# Patient Record
Sex: MALE | Race: WHITE | URBAN - METROPOLITAN AREA
[De-identification: names, ages, dates, MRNs, and addresses within clinical notes are randomized per-mention and may not be internally consistent; named-entity substitution may affect disease eponyms.]

---

## 2023-02-09 PROBLEM — E78.5 HLD (HYPERLIPIDEMIA): Status: ACTIVE | Noted: 2023-02-09

## 2023-02-09 PROBLEM — Z95.1 HISTORY OF CORONARY ARTERY BYPASS GRAFT: Status: ACTIVE | Noted: 2023-02-09

## 2023-02-09 PROBLEM — I25.10 CAD (CORONARY ARTERY DISEASE): Status: ACTIVE | Noted: 2023-02-09

## 2023-02-09 RX ORDER — LANOLIN ALCOHOL/MO/W.PET/CERES
CREAM (GRAM) TOPICAL DAILY
COMMUNITY
Start: 2022-07-14

## 2023-02-09 RX ORDER — ASPIRIN 81 MG/1
1 TABLET ORAL DAILY
COMMUNITY
Start: 2022-07-14 | End: 2023-04-12 | Stop reason: ALTCHOICE

## 2023-02-09 RX ORDER — LORATADINE 10 MG/1
1 TABLET ORAL DAILY PRN
COMMUNITY
Start: 2022-07-14 | End: 2024-06-04 | Stop reason: ALTCHOICE

## 2023-02-09 RX ORDER — MELATONIN 10 MG/ML
DROPS ORAL DAILY
COMMUNITY
Start: 2022-07-14

## 2023-02-09 RX ORDER — EVOLOCUMAB 140 MG/ML
140 INJECTION, SOLUTION SUBCUTANEOUS
COMMUNITY
Start: 2022-07-14

## 2023-02-09 RX ORDER — GLUCOSAMINE/CHONDR SU A SOD 750-600 MG
TABLET ORAL
COMMUNITY
Start: 2022-07-14

## 2023-02-23 PROBLEM — I25.10 ATHEROSCLEROTIC HEART DISEASE OF NATIVE CORONARY ARTERY WITHOUT ANGINA PECTORIS: Status: ACTIVE | Noted: 2023-02-23

## 2023-03-27 DIAGNOSIS — M79.604 PAIN OF RIGHT LOWER EXTREMITY: Primary | ICD-10-CM

## 2023-03-29 DIAGNOSIS — R60.9 EDEMA, UNSPECIFIED TYPE: Primary | ICD-10-CM

## 2023-04-12 ENCOUNTER — OFFICE VISIT (OUTPATIENT)
Dept: PRIMARY CARE | Facility: CLINIC | Age: 78
End: 2023-04-12
Payer: MEDICARE

## 2023-04-12 ENCOUNTER — LAB (OUTPATIENT)
Dept: LAB | Facility: LAB | Age: 78
End: 2023-04-12
Payer: MEDICARE

## 2023-04-12 VITALS — WEIGHT: 137 LBS | DIASTOLIC BLOOD PRESSURE: 80 MMHG | BODY MASS INDEX: 20.23 KG/M2 | SYSTOLIC BLOOD PRESSURE: 152 MMHG

## 2023-04-12 DIAGNOSIS — E78.41 ELEVATED LIPOPROTEIN(A): ICD-10-CM

## 2023-04-12 DIAGNOSIS — I25.10 CORONARY ARTERY DISEASE INVOLVING NATIVE CORONARY ARTERY OF NATIVE HEART WITHOUT ANGINA PECTORIS: ICD-10-CM

## 2023-04-12 DIAGNOSIS — I82.4Z1 ACUTE DEEP VEIN THROMBOSIS (DVT) OF DISTAL VEIN OF RIGHT LOWER EXTREMITY (MULTI): ICD-10-CM

## 2023-04-12 DIAGNOSIS — I82.461 ACUTE DEEP VEIN THROMBOSIS (DVT) OF CALF MUSCLE VEIN OF RIGHT LOWER EXTREMITY (MULTI): Primary | ICD-10-CM

## 2023-04-12 DIAGNOSIS — Z95.1 HISTORY OF CORONARY ARTERY BYPASS GRAFT: ICD-10-CM

## 2023-04-12 DIAGNOSIS — I82.461 ACUTE DEEP VEIN THROMBOSIS (DVT) OF CALF MUSCLE VEIN OF RIGHT LOWER EXTREMITY (MULTI): ICD-10-CM

## 2023-04-12 PROBLEM — I82.431 ACUTE DEEP VEIN THROMBOSIS (DVT) OF POPLITEAL VEIN OF RIGHT LOWER EXTREMITY (MULTI): Status: ACTIVE | Noted: 2023-04-12

## 2023-04-12 LAB
ACTIVATED PARTIAL THROMBOPLASTIN TIME IN PPP BY COAGULATION ASSAY: 32 SEC (ref 26–39)
INR IN PPP BY COAGULATION ASSAY: 1.2 (ref 0.9–1.1)
PROTHROMBIN TIME (PT) IN PPP BY COAGULATION ASSAY: 14.4 SEC (ref 9.8–13.4)

## 2023-04-12 PROCEDURE — 36415 COLL VENOUS BLD VENIPUNCTURE: CPT

## 2023-04-12 PROCEDURE — 82542 COL CHROMOTOGRAPHY QUAL/QUAN: CPT

## 2023-04-12 PROCEDURE — 1159F MED LIST DOCD IN RCRD: CPT | Performed by: FAMILY MEDICINE

## 2023-04-12 PROCEDURE — 1036F TOBACCO NON-USER: CPT | Performed by: FAMILY MEDICINE

## 2023-04-12 PROCEDURE — 83918 ORGANIC ACIDS TOTAL QUANT: CPT

## 2023-04-12 PROCEDURE — 85306 CLOT INHIBIT PROT S FREE: CPT

## 2023-04-12 PROCEDURE — 85730 THROMBOPLASTIN TIME PARTIAL: CPT

## 2023-04-12 PROCEDURE — 1160F RVW MEDS BY RX/DR IN RCRD: CPT | Performed by: FAMILY MEDICINE

## 2023-04-12 PROCEDURE — 85303 CLOT INHIBIT PROT C ACTIVITY: CPT

## 2023-04-12 PROCEDURE — 99213 OFFICE O/P EST LOW 20 MIN: CPT | Performed by: FAMILY MEDICINE

## 2023-04-12 PROCEDURE — 83090 ASSAY OF HOMOCYSTEINE: CPT

## 2023-04-12 PROCEDURE — 85610 PROTHROMBIN TIME: CPT

## 2023-04-12 PROCEDURE — 83921 ORGANIC ACID SINGLE QUANT: CPT

## 2023-04-12 PROCEDURE — 85220 BLOOC CLOT FACTOR V TEST: CPT

## 2023-04-12 PROCEDURE — 82136 AMINO ACIDS QUANT 2-5: CPT

## 2023-04-12 RX ORDER — APIXABAN 5 MG/1
5 TABLET, FILM COATED ORAL 2 TIMES DAILY
COMMUNITY
Start: 2023-03-29 | End: 2024-06-04 | Stop reason: ALTCHOICE

## 2023-04-12 ASSESSMENT — ENCOUNTER SYMPTOMS
ALLERGIC/IMMUNOLOGIC NEGATIVE: 1
MUSCLE WEAKNESS: 0
ENDOCRINE NEGATIVE: 1
LOSS OF MOTION: 1
PSYCHIATRIC NEGATIVE: 1
GASTROINTESTINAL NEGATIVE: 1
ARTHRALGIAS: 1
RESPIRATORY NEGATIVE: 1
LOSS OF SENSATION: 0
NUMBNESS: 1
CONSTITUTIONAL NEGATIVE: 1
EYES NEGATIVE: 1
CARDIOVASCULAR NEGATIVE: 1
LEG PAIN: 1
HEMATOLOGIC/LYMPHATIC NEGATIVE: 1
TINGLING: 0
INABILITY TO BEAR WEIGHT: 1

## 2023-04-12 NOTE — PATIENT INSTRUCTIONS
Contd meds , diet ,daily X;s, all reports noted , jaimee palmer, bleeding precaution , FUWOD' , labs

## 2023-04-12 NOTE — PROGRESS NOTES
Subjective   Patient ID: Yvon Mendoza is a 77 y.o. male who presents for Follow-up (2 week follow up /DVT/).    Follow op DVT 04/12/23        Leg Pain   The incident occurred more than 1 week ago. There was no injury mechanism. The pain is present in the right leg. The quality of the pain is described as aching. The pain is moderate. The pain has been Fluctuating since onset. Associated symptoms include an inability to bear weight, a loss of motion and numbness. Pertinent negatives include no loss of sensation, muscle weakness or tingling. He reports no foreign bodies present. The symptoms are aggravated by movement. He has tried non-weight bearing, ice, rest, acetaminophen and NSAIDs for the symptoms. The treatment provided moderate relief.        Review of Systems   Constitutional: Negative.    HENT: Negative.     Eyes: Negative.    Respiratory: Negative.     Cardiovascular: Negative.    Gastrointestinal: Negative.    Endocrine: Negative.    Genitourinary: Negative.    Musculoskeletal:  Positive for arthralgias.   Skin: Negative.    Allergic/Immunologic: Negative.    Neurological:  Positive for numbness. Negative for tingling.   Hematological: Negative.    Psychiatric/Behavioral: Negative.         Objective   /80 (BP Location: Right arm, Patient Position: Sitting)   Wt 62.1 kg (137 lb)   BMI 20.23 kg/m²     Physical Exam  Constitutional:       Appearance: Normal appearance.   HENT:      Head: Normocephalic and atraumatic.      Nose: Nose normal.   Eyes:      Extraocular Movements: Extraocular movements intact.      Pupils: Pupils are equal, round, and reactive to light.   Cardiovascular:      Rate and Rhythm: Normal rate and regular rhythm.      Pulses: Normal pulses.   Pulmonary:      Effort: Pulmonary effort is normal.   Musculoskeletal:      Cervical back: Normal range of motion.      Right lower leg: No edema (2+ , Fuad VV).   Neurological:      General: No focal deficit present.      Mental Status:  He is alert and oriented to person, place, and time.   Psychiatric:         Mood and Affect: Mood normal.         Behavior: Behavior normal.         Assessment/Plan

## 2023-04-13 LAB
FACTOR V ACTIVITY ACTUAL/NORMAL IN PPP: 83 % (ref 65–140)
HOMOCYSTEINE (UMOL/L) IN SER/PLAS: 12.44 UMOL/L (ref 5–13.9)

## 2023-04-18 LAB
PROTEIN C ACTUAL/NORMAL IN PPP BY COAGULATION ASSAY: 128 %ACTIVITY (ref 70–150)
PROTEIN S ACTUAL/NORMAL IN PPP BY COAGULATION ASSAY: >150 %ACTIVITY (ref 64–150)

## 2023-04-24 LAB
Lab: 10.1 UMOL/L (ref 5.1–13.9)
Lab: 113 NMOL/L (ref 0–378)
Lab: 164 NMOL/L (ref 60–228)
Lab: 206 NMOL/L (ref 44–342)
Lab: NORMAL

## 2023-09-14 LAB
ALANINE AMINOTRANSFERASE (SGPT) (U/L) IN SER/PLAS: 15 U/L (ref 10–52)
ALBUMIN (G/DL) IN SER/PLAS: 4.4 G/DL (ref 3.4–5)
ALKALINE PHOSPHATASE (U/L) IN SER/PLAS: 41 U/L (ref 33–136)
ASPARTATE AMINOTRANSFERASE (SGOT) (U/L) IN SER/PLAS: 15 U/L (ref 9–39)
BILIRUBIN DIRECT (MG/DL) IN SER/PLAS: 0.1 MG/DL (ref 0–0.3)
BILIRUBIN TOTAL (MG/DL) IN SER/PLAS: 0.7 MG/DL (ref 0–1.2)
CHOLESTEROL (MG/DL) IN SER/PLAS: 123 MG/DL (ref 0–199)
CHOLESTEROL IN HDL (MG/DL) IN SER/PLAS: 47.6 MG/DL
CHOLESTEROL/HDL RATIO: 2.6
LDL: 45 MG/DL (ref 0–99)
PROTEIN TOTAL: 7.3 G/DL (ref 6.4–8.2)
TRIGLYCERIDE (MG/DL) IN SER/PLAS: 152 MG/DL (ref 0–149)
VLDL: 30 MG/DL (ref 0–40)

## 2023-10-06 ENCOUNTER — TELEPHONE (OUTPATIENT)
Dept: CARDIOLOGY | Facility: CLINIC | Age: 78
End: 2023-10-06
Payer: MEDICARE

## 2023-10-06 NOTE — TELEPHONE ENCOUNTER
Spoke to patient and he states that this PA is for next year.  Advised patient that is to too early to do a PA for next year and to contact office closer to the end of the year so we can do this PA for him

## 2023-12-04 ENCOUNTER — OFFICE VISIT (OUTPATIENT)
Dept: PRIMARY CARE | Facility: CLINIC | Age: 78
End: 2023-12-04
Payer: MEDICARE

## 2023-12-04 VITALS
HEIGHT: 69 IN | WEIGHT: 144.4 LBS | RESPIRATION RATE: 16 BRPM | OXYGEN SATURATION: 99 % | DIASTOLIC BLOOD PRESSURE: 74 MMHG | BODY MASS INDEX: 21.39 KG/M2 | HEART RATE: 82 BPM | TEMPERATURE: 97.2 F | SYSTOLIC BLOOD PRESSURE: 111 MMHG

## 2023-12-04 DIAGNOSIS — I25.10 ATHEROSCLEROSIS OF NATIVE CORONARY ARTERY OF NATIVE HEART WITHOUT ANGINA PECTORIS: ICD-10-CM

## 2023-12-04 DIAGNOSIS — E78.2 MIXED HYPERLIPIDEMIA: ICD-10-CM

## 2023-12-04 DIAGNOSIS — R73.9 HYPERGLYCEMIA: ICD-10-CM

## 2023-12-04 DIAGNOSIS — I25.10 CORONARY ARTERY DISEASE INVOLVING NATIVE CORONARY ARTERY OF NATIVE HEART WITHOUT ANGINA PECTORIS: ICD-10-CM

## 2023-12-04 DIAGNOSIS — Z00.00 ROUTINE GENERAL MEDICAL EXAMINATION AT HEALTH CARE FACILITY: Primary | ICD-10-CM

## 2023-12-04 PROCEDURE — G0439 PPPS, SUBSEQ VISIT: HCPCS | Performed by: FAMILY MEDICINE

## 2023-12-04 PROCEDURE — 1036F TOBACCO NON-USER: CPT | Performed by: FAMILY MEDICINE

## 2023-12-04 PROCEDURE — 1160F RVW MEDS BY RX/DR IN RCRD: CPT | Performed by: FAMILY MEDICINE

## 2023-12-04 PROCEDURE — 1170F FXNL STATUS ASSESSED: CPT | Performed by: FAMILY MEDICINE

## 2023-12-04 PROCEDURE — 99215 OFFICE O/P EST HI 40 MIN: CPT | Performed by: FAMILY MEDICINE

## 2023-12-04 PROCEDURE — 1159F MED LIST DOCD IN RCRD: CPT | Performed by: FAMILY MEDICINE

## 2023-12-04 ASSESSMENT — ACTIVITIES OF DAILY LIVING (ADL)
TAKING_MEDICATION: INDEPENDENT
BATHING: INDEPENDENT
GROCERY_SHOPPING: INDEPENDENT
DRESSING: INDEPENDENT
MANAGING_FINANCES: INDEPENDENT
DOING_HOUSEWORK: INDEPENDENT

## 2023-12-04 ASSESSMENT — ENCOUNTER SYMPTOMS
LOSS OF SENSATION IN FEET: 0
DEPRESSION: 0
OCCASIONAL FEELINGS OF UNSTEADINESS: 0

## 2023-12-04 ASSESSMENT — PATIENT HEALTH QUESTIONNAIRE - PHQ9
1. LITTLE INTEREST OR PLEASURE IN DOING THINGS: NOT AT ALL
2. FEELING DOWN, DEPRESSED OR HOPELESS: NOT AT ALL
SUM OF ALL RESPONSES TO PHQ9 QUESTIONS 1 & 2: 0

## 2023-12-04 NOTE — PROGRESS NOTES
"Subjective   Reason for Visit: Yvon Mendoza is an 78 y.o. male here for a Medicare Wellness visit.          Reviewed all medications by prescribing practitioner or clinical pharmacist (such as prescriptions, OTCs, herbal therapies and supplements) and documented in the medical record.    HPI    Patient Care Team:  Obinna Abdullahi MD as PCP - General (Family Medicine)  Tolu Seals [facility] P[facility]     Review of Systems    Objective   Vitals:  /74   Pulse 82   Temp 36.2 °C (97.2 °F)   Resp 16   Ht 1.753 m (5' 9\")   Wt 65.5 kg (144 lb 6.4 oz)   SpO2 99%   BMI 21.32 kg/m²       Physical Exam    Assessment/Plan   Problem List Items Addressed This Visit       CAD (coronary artery disease)    HLD (hyperlipidemia)    Relevant Orders    Lipid Panel    Atherosclerotic heart disease of native coronary artery without angina pectoris    Overview     Atherosclerosis of native coronary artery without angina pectoris, unspecified whether native or transplanted heart         Body mass index (BMI) of 21.0 to 21.9 in adult    Relevant Orders    CBC and Auto Differential    Comprehensive Metabolic Panel    Hyperglycemia    Relevant Orders    Hemoglobin A1C     Other Visit Diagnoses       Routine general medical examination at health care facility    -  Primary    Relevant Orders    1 Year Follow Up In [facility] - PCP - Wellness Exam               "

## 2023-12-04 NOTE — PATIENT INSTRUCTIONS
Patient to continue current medications (with any exceptions as noted) and diet. Follow-up in 6 month(s) otherwise as needed.      Patient is to return for fasting CBC, CMP, lipid panel labs at their convenience prior to his next appointment. Fasting is nothing to eat or drink except water or black coffee for 8-12 hours. Will call patient with results when available.     Patient is to follow up with Dr. De as scheduled.     Will check on the status of his most recent pneumonia vaccine at the pharmacy.

## 2023-12-04 NOTE — PROGRESS NOTES
"Subjective   Patient ID: Yvon Mendoza is a 78 y.o. male who presents for New Patient Visit and Medicare Annual Wellness Visit Subsequent.    HPI   Patient was previously being seen by Dr. Seals.     Patient is also being seen by Dr. De.     Patient states that he has been having some right leg pain with right foot numbness as well as fatigue. Patient states that he believes that the lack of energy is from past Covid infections. He states that thinks he has had it 3x since 2020.     Patient states that he slipped and fell on ice a while ago on his right hip.     Patient also states that he went bowling back in November and was sore on his right thigh.     Review of Systems  Except positives as noted in the CC & HPI      Constitutional: Denies fevers, chills, night sweats, weight changes, change in appetite    Eyes: Denies blurry vision, double vision    ENT: Denies otalgia, trouble hearing, tinnitus, vertigo, nasal congestion, rhinorrhea, sore throat    Neck: Denies swelling, masses    Cardiovascular: Denies chest pain, palpitations, edema, orthopnea, syncope    Respiratory: Denies dyspnea, cough, wheezing, postural nocturnal dyspnea    Gastrointestinal: Denies abdominal pain, nausea, vomiting, diarrhea, constipation, melena, hematochezia    Genitourinary: Denies dysuria, hematuria, frequency, urgency    Musculoskeletal: Denies back pain, neck pain  Integumentary: Denies skin lesions, rashes, masses    Neurological: Denies dizziness, headaches, confusion, limb weakness, syncope, convulsions    Psychiatric: Denies depression, anxiety, homicidal ideations, suicidal ideations, sleep disturbances    Endocrine: Denies polyphagia, polydipsia, polyuria, weakness, hair thinning, heat intolerance, cold intolerance, weight changes    Heme/Lymph: Denies easy bruising, easy bleeding, swollen glands    Objective   /74   Pulse 82   Temp 36.2 °C (97.2 °F)   Resp 16   Ht 1.753 m (5' 9\")   Wt 65.5 kg (144 lb 6.4 " oz)   SpO2 99%   BMI 21.32 kg/m²     Physical Exam  122/74 on recheck of BP in the right arm.     General Appearance - well-developed, well-nourished, 78 y.o., White male in no acute distress.     Skin - warm, pink and dry without rash or concerning lesions.     Mental Status - alert and oriented x 3. Normal mood and affect appropriate to mood.     Ears - TMs shiny and move well with insufflation. Ear canals are clear bilaterally.     Neck - supple without lymphadenopathy. Carotid pulses are normal without bruits. Thyroid is normal in midline without nodules.     Chest - lungs are clear to auscultation without rales, rhonchi or wheezes.     Heart - regular, rate and rhythm without murmurs, rubs or gallops.    Abdomen - soft, flat, nontender, nondistended. No masses, hepatomegaly or splenomegaly is noted. No rebound, rigidity or guarding is noted. Bowel sounds are normoactive.    Extremities - no cyanosis, clubbing or edema. Pedal pulses are 2+ normal at the dorsalis pedis and posterior pulses bilaterally.     Neurological - cranial nerves II through XII are grossly intact. Motor strength 5/5 at all fours.     Assessment/Plan   1. Routine general medical examination at health care facility  1 Year Follow Up In [facility] - PCP - Wellness Exam      2. Atherosclerosis of native coronary artery of native heart without angina pectoris        3. Mixed hyperlipidemia  Lipid Panel      4. Coronary artery disease involving native coronary artery of native heart without angina pectoris        5. Hyperglycemia  Hemoglobin A1C      6. Body mass index (BMI) of 21.0 to 21.9 in adult  CBC and Auto Differential    Comprehensive Metabolic Panel      Patient to continue current medications (with any exceptions as noted) and diet. Follow-up in 6 month(s) otherwise as needed.      Patient is to return for fasting CBC, CMP, lipid panel, A1c labs at their convenience prior to his next appointment. Fasting is nothing to eat  or drink except water or black coffee for 8-12 hours. Will call patient with results when available.     Patient is to follow up with Dr. De as scheduled.     Will check on the status of his most recent pneumonia vaccine at the pharmacy.         Scribe Attestation  By signing my name below, IKari Scribe   attest that this documentation has been prepared under the direction and in the presence of Obinna Abdullahi MD.

## 2023-12-11 ENCOUNTER — TELEPHONE (OUTPATIENT)
Dept: PRIMARY CARE | Facility: CLINIC | Age: 78
End: 2023-12-11

## 2023-12-11 NOTE — TELEPHONE ENCOUNTER
Patient called in to provide his most recent pneumonia vaccination information. He stated his last one was a PPSV 23 booster. Patient stated he had this on 5/29/2017 and would like Dr. Abdullahi to be aware of this  Please Advise

## 2024-05-28 ENCOUNTER — LAB (OUTPATIENT)
Dept: LAB | Facility: LAB | Age: 79
End: 2024-05-28
Payer: MEDICARE

## 2024-05-28 DIAGNOSIS — R73.9 HYPERGLYCEMIA: ICD-10-CM

## 2024-05-28 DIAGNOSIS — E78.2 MIXED HYPERLIPIDEMIA: ICD-10-CM

## 2024-05-28 LAB
ALBUMIN SERPL BCP-MCNC: 4.5 G/DL (ref 3.4–5)
ALP SERPL-CCNC: 37 U/L (ref 33–136)
ALT SERPL W P-5'-P-CCNC: 16 U/L (ref 10–52)
ANION GAP SERPL CALC-SCNC: 11 MMOL/L (ref 10–20)
AST SERPL W P-5'-P-CCNC: 17 U/L (ref 9–39)
BASOPHILS # BLD AUTO: 0.02 X10*3/UL (ref 0–0.1)
BASOPHILS NFR BLD AUTO: 0.4 %
BILIRUB SERPL-MCNC: 0.5 MG/DL (ref 0–1.2)
BUN SERPL-MCNC: 24 MG/DL (ref 6–23)
CALCIUM SERPL-MCNC: 9.6 MG/DL (ref 8.6–10.3)
CHLORIDE SERPL-SCNC: 105 MMOL/L (ref 98–107)
CHOLEST SERPL-MCNC: 144 MG/DL (ref 0–199)
CHOLESTEROL/HDL RATIO: 2.4
CO2 SERPL-SCNC: 29 MMOL/L (ref 21–32)
CREAT SERPL-MCNC: 1.2 MG/DL (ref 0.5–1.3)
EGFRCR SERPLBLD CKD-EPI 2021: 62 ML/MIN/1.73M*2
EOSINOPHIL # BLD AUTO: 0.08 X10*3/UL (ref 0–0.4)
EOSINOPHIL NFR BLD AUTO: 1.8 %
ERYTHROCYTE [DISTWIDTH] IN BLOOD BY AUTOMATED COUNT: 12.8 % (ref 11.5–14.5)
EST. AVERAGE GLUCOSE BLD GHB EST-MCNC: 114 MG/DL
GLUCOSE SERPL-MCNC: 103 MG/DL (ref 74–99)
HBA1C MFR BLD: 5.6 %
HCT VFR BLD AUTO: 46.5 % (ref 41–52)
HDLC SERPL-MCNC: 59.3 MG/DL
HGB BLD-MCNC: 15.1 G/DL (ref 13.5–17.5)
IMM GRANULOCYTES # BLD AUTO: 0.01 X10*3/UL (ref 0–0.5)
IMM GRANULOCYTES NFR BLD AUTO: 0.2 % (ref 0–0.9)
LDLC SERPL CALC-MCNC: 65 MG/DL
LYMPHOCYTES # BLD AUTO: 1.64 X10*3/UL (ref 0.8–3)
LYMPHOCYTES NFR BLD AUTO: 36.4 %
MCH RBC QN AUTO: 32.9 PG (ref 26–34)
MCHC RBC AUTO-ENTMCNC: 32.5 G/DL (ref 32–36)
MCV RBC AUTO: 101 FL (ref 80–100)
MONOCYTES # BLD AUTO: 0.59 X10*3/UL (ref 0.05–0.8)
MONOCYTES NFR BLD AUTO: 13.1 %
NEUTROPHILS # BLD AUTO: 2.17 X10*3/UL (ref 1.6–5.5)
NEUTROPHILS NFR BLD AUTO: 48.1 %
NON HDL CHOLESTEROL: 85 MG/DL (ref 0–149)
NRBC BLD-RTO: 0 /100 WBCS (ref 0–0)
PLATELET # BLD AUTO: 187 X10*3/UL (ref 150–450)
POTASSIUM SERPL-SCNC: 4.5 MMOL/L (ref 3.5–5.3)
PROT SERPL-MCNC: 7 G/DL (ref 6.4–8.2)
RBC # BLD AUTO: 4.59 X10*6/UL (ref 4.5–5.9)
SODIUM SERPL-SCNC: 140 MMOL/L (ref 136–145)
TRIGL SERPL-MCNC: 100 MG/DL (ref 0–149)
VLDL: 20 MG/DL (ref 0–40)
WBC # BLD AUTO: 4.5 X10*3/UL (ref 4.4–11.3)

## 2024-05-28 PROCEDURE — 85025 COMPLETE CBC W/AUTO DIFF WBC: CPT

## 2024-05-28 PROCEDURE — 80061 LIPID PANEL: CPT

## 2024-05-28 PROCEDURE — 36415 COLL VENOUS BLD VENIPUNCTURE: CPT

## 2024-05-28 PROCEDURE — 80053 COMPREHEN METABOLIC PANEL: CPT

## 2024-05-28 PROCEDURE — 83036 HEMOGLOBIN GLYCOSYLATED A1C: CPT

## 2024-06-04 ENCOUNTER — OFFICE VISIT (OUTPATIENT)
Dept: PRIMARY CARE | Facility: CLINIC | Age: 79
End: 2024-06-04
Payer: MEDICARE

## 2024-06-04 VITALS
OXYGEN SATURATION: 97 % | TEMPERATURE: 97.3 F | RESPIRATION RATE: 18 BRPM | HEART RATE: 88 BPM | BODY MASS INDEX: 21.24 KG/M2 | WEIGHT: 143.4 LBS | DIASTOLIC BLOOD PRESSURE: 60 MMHG | SYSTOLIC BLOOD PRESSURE: 108 MMHG | HEIGHT: 69 IN

## 2024-06-04 DIAGNOSIS — I25.10 ATHEROSCLEROSIS OF NATIVE CORONARY ARTERY OF NATIVE HEART WITHOUT ANGINA PECTORIS: ICD-10-CM

## 2024-06-04 DIAGNOSIS — E78.00 PURE HYPERCHOLESTEROLEMIA: ICD-10-CM

## 2024-06-04 DIAGNOSIS — Z23 NEED FOR PNEUMOCOCCAL 20-VALENT CONJUGATE VACCINATION: ICD-10-CM

## 2024-06-04 DIAGNOSIS — Z00.00 ROUTINE GENERAL MEDICAL EXAMINATION AT HEALTH CARE FACILITY: Primary | ICD-10-CM

## 2024-06-04 PROBLEM — I82.431 ACUTE DEEP VEIN THROMBOSIS (DVT) OF POPLITEAL VEIN OF RIGHT LOWER EXTREMITY (MULTI): Status: RESOLVED | Noted: 2023-04-12 | Resolved: 2024-06-04

## 2024-06-04 PROCEDURE — G0009 ADMIN PNEUMOCOCCAL VACCINE: HCPCS | Performed by: FAMILY MEDICINE

## 2024-06-04 PROCEDURE — 1036F TOBACCO NON-USER: CPT | Performed by: FAMILY MEDICINE

## 2024-06-04 PROCEDURE — 90677 PCV20 VACCINE IM: CPT | Performed by: FAMILY MEDICINE

## 2024-06-04 PROCEDURE — 1159F MED LIST DOCD IN RCRD: CPT | Performed by: FAMILY MEDICINE

## 2024-06-04 PROCEDURE — 1160F RVW MEDS BY RX/DR IN RCRD: CPT | Performed by: FAMILY MEDICINE

## 2024-06-04 PROCEDURE — 99214 OFFICE O/P EST MOD 30 MIN: CPT | Performed by: FAMILY MEDICINE

## 2024-06-04 ASSESSMENT — PATIENT HEALTH QUESTIONNAIRE - PHQ9
2. FEELING DOWN, DEPRESSED OR HOPELESS: NOT AT ALL
SUM OF ALL RESPONSES TO PHQ9 QUESTIONS 1 & 2: 0
1. LITTLE INTEREST OR PLEASURE IN DOING THINGS: NOT AT ALL

## 2024-06-04 NOTE — PROGRESS NOTES
"Chief Complaint   Patient presents with    Follow-up     CAD  Hyperlipidemia   Hyperglycemia        HPI: Yvon Mendoza is a 78 y.o. male presenting for Follow-up (CAD/Hyperlipidemia /Hyperglycemia )  I last saw the patient 12/4/2023.     Patient has no medical complaints today or refill requests for rooming MA.    ROS    Except positives as noted in the CC & HPI   Constitutional: Denies fevers, chills, night sweats, fatigue, weight changes, change in appetite   Eyes: Denies blurry vision, double vision   ENT: Denies otalgia, trouble hearing, tinnitus, vertigo, nasal congestion, rhinorrhea, sore throat   Neck: Denies swelling, masses   Cardiovascular: Denies chest pain, palpitations, edema, orthopnea, syncope   Respiratory: Denies dyspnea, cough, wheezing, postural nocturnal dyspnea   Gastrointestinal: Denies abdominal pain, nausea, vomiting, diarrhea, constipation, melena, hematochezia   Genitourinary: Denies dysuria, hematuria, frequency, urgency   Musculoskeletal: Denies back pain, neck pain, arthralgias, myalgias   Integumentary: Denies skin lesions, rashes, masses   Neurological: Denies dizziness, headaches, confusion, limb weakness, paresthesias, syncope, convulsions   Psychiatric: Denies depression, anxiety, homicidal ideations, suicidal ideations, sleep disturbances   Endocrine: Denies polyphagia, polydipsia, polyuria, weakness, hair thinning, heat intolerance, cold intolerance, weight changes   Heme/Lymph: Denies easy bruising, easy bleeding, swollen glands     Vitals:    06/04/24 0850   BP: 108/60   BP Location: Left arm   Patient Position: Sitting   Pulse: 88   Resp: 18   Temp: 36.3 °C (97.3 °F)   TempSrc: Temporal   SpO2: 97%   Weight: 65 kg (143 lb 6.4 oz)   Height: 1.753 m (5' 9\")       PHYSICAL EXAM    GENERAL APPEARANCE: well-developed, well-nourished, 78 y.o. male in no acute distress.  SKIN: warm, pink and dry without rash or concerning lesions.  MENTAL STATUS: alert and oriented × 3. Normal mood " and affect appropriate to mood.  NECK: supple without lymphadenopathy. Carotid pulses are normal without bruits. Thyroid - is normal in midline without nodules.  CHEST: lungs are clear to auscultation without rales, rhonchi or wheezes.  HEART: regular, rate and rhythm without murmurs, rubs or gallops.  ABDOMEN: soft, flat, nondistended. No masses, hepatomegaly or splenomegaly is noted.  EXTREMITIES: no cyanosis, clubbing or edema. Pedal pulses are 2+ normal at the dorsalis pedis and posterior tibial pulses bilaterally.  NEUROLOGICAL: cranial nerves II through XII are grossly intact. Motor strength 5/5 at all fours.     Below is the patient's most recent value for Albumin, ALT, AST, BUN, Calcium, Chloride, Cholesterol, CO2, Creatinine, GFR, Glucose, HDL, Hematocrit, Hemoglobin, Hemoglobin A1C, LDL, Magnesium, Phosphorus, Platelets, Potassium, PSA, Sodium, Triglycerides, and WBC.   Lab Results   Component Value Date    ALBUMIN 4.5 05/28/2024    ALT 16 05/28/2024    AST 17 05/28/2024    BUN 24 (H) 05/28/2024    CALCIUM 9.6 05/28/2024     05/28/2024    CHOL 144 05/28/2024    CO2 29 05/28/2024    CREATININE 1.20 05/28/2024    HDL 59.3 05/28/2024    HCT 46.5 05/28/2024    HGB 15.1 05/28/2024    HGBA1C 5.6 05/28/2024     05/28/2024    K 4.5 05/28/2024     05/28/2024    TRIG 100 05/28/2024    WBC 4.5 05/28/2024         ASSESSMENT:  1. Routine general medical examination at health care facility  1 Year Follow Up In [facility] - PCP - Wellness Exam      2. Pure hypercholesterolemia        3. Atherosclerosis of native coronary artery of native heart without angina pectoris        4. Need for pneumococcal 20-valent conjugate vaccination  Pneumococcal conjugate vaccine, 20-valent (PREVNAR 20)          PLAN:  Patient will continue current diet, exercise plan, and medications.   I have instructed the patient to follow-up with me in 6 months or sooner if needed.    Patient was given a Prevnar 20 in the  office today.    Patient will continue follow-up with his cardiologist, Dr. De.

## 2024-07-20 DIAGNOSIS — E78.2 MIXED HYPERLIPIDEMIA: ICD-10-CM

## 2024-07-20 DIAGNOSIS — I25.10 ATHEROSCLEROSIS OF NATIVE CORONARY ARTERY OF NATIVE HEART WITHOUT ANGINA PECTORIS: ICD-10-CM

## 2024-07-24 RX ORDER — EVOLOCUMAB 140 MG/ML
INJECTION, SOLUTION SUBCUTANEOUS
Qty: 6 ML | Refills: 1 | Status: SHIPPED | OUTPATIENT
Start: 2024-07-24

## 2024-08-05 ENCOUNTER — APPOINTMENT (OUTPATIENT)
Dept: CARDIOLOGY | Facility: CLINIC | Age: 79
End: 2024-08-05
Payer: MEDICARE

## 2024-08-05 VITALS
BODY MASS INDEX: 21.06 KG/M2 | HEART RATE: 72 BPM | DIASTOLIC BLOOD PRESSURE: 74 MMHG | SYSTOLIC BLOOD PRESSURE: 138 MMHG | WEIGHT: 142.58 LBS

## 2024-08-05 DIAGNOSIS — E78.2 MIXED HYPERLIPIDEMIA: ICD-10-CM

## 2024-08-05 DIAGNOSIS — I25.10 CORONARY ARTERY DISEASE INVOLVING NATIVE CORONARY ARTERY OF NATIVE HEART WITHOUT ANGINA PECTORIS: ICD-10-CM

## 2024-08-05 DIAGNOSIS — Z78.9 NEVER SMOKED TOBACCO: ICD-10-CM

## 2024-08-05 DIAGNOSIS — Z95.1 HISTORY OF CORONARY ARTERY BYPASS GRAFT: ICD-10-CM

## 2024-08-05 PROCEDURE — 1159F MED LIST DOCD IN RCRD: CPT | Performed by: INTERNAL MEDICINE

## 2024-08-05 PROCEDURE — 1036F TOBACCO NON-USER: CPT | Performed by: INTERNAL MEDICINE

## 2024-08-05 PROCEDURE — 99214 OFFICE O/P EST MOD 30 MIN: CPT | Performed by: INTERNAL MEDICINE

## 2024-08-05 RX ORDER — ASPIRIN 81 MG/1
81 TABLET ORAL DAILY
COMMUNITY

## 2024-08-05 ASSESSMENT — ENCOUNTER SYMPTOMS
RESPIRATORY NEGATIVE: 1
NEUROLOGICAL NEGATIVE: 1
CARDIOVASCULAR NEGATIVE: 1
CONSTITUTIONAL NEGATIVE: 1

## 2024-08-05 NOTE — PROGRESS NOTES
CARDIOLOGY OFFICE VISIT      CHIEF COMPLAINT  Chief Complaint   Patient presents with    Follow-up     Right leg pain, LOV 9/22023.        HISTORY OF PRESENT ILLNESS  Yvon Mendoza is a 78 y.o. year old male patient with a history of CABG x 4 in 2010 and stress test within the last couple of years that was normal.  Echo showed mild septal hypertrophy with normal LV function.  He has been doing well, denying any chest pain or shortness of breath and he stays very active with a lot of walking.  He denies orthopnea proximal nocturnal dyspnea.  His labs from May 2024 shows cholesterol is 144, HDL is 59, LDL is 65 and triglyceride is under and transaminases are within normal limits.    ASSESSMENT AND PLAN  1.  CAD s/p CABG as noted above with no recurrent chest pain, continue lifelong aspirin.  2.  Hypertension: Blood pressure is well-controlled on current medications which we will continue.  3.  Dyslipidemia: With acceptable lipid profile as noted above, continue with Repatha    Problem List Items Addressed This Visit       CAD (coronary artery disease)    History of coronary artery bypass graft    HLD (hyperlipidemia)    Body mass index (BMI) of 21.0 to 21.9 in adult    Never smoked tobacco       Recent Cardiovascular Testing:    Echo-  Stress-  Cath-  Carotid Ultrasound-    Past Medical History  Past Medical History:   Diagnosis Date    Atherosclerotic heart disease of native coronary artery without angina pectoris 07/14/2022    Atherosclerosis of native coronary artery without angina pectoris, unspecified whether native or transplanted heart    Disease of thyroid gland 1990    Heart disease October 2009    Hyperlipidemia        Social History  Social History     Tobacco Use    Smoking status: Never    Smokeless tobacco: Never   Vaping Use    Vaping status: Never Used   Substance Use Topics    Alcohol use: Yes     Alcohol/week: 7.0 standard drinks of alcohol     Types: 7 Glasses of wine per week     Comment: daily  "red wine    Drug use: Never       Family History     Family History   Problem Relation Name Age of Onset    Hypertension Father Daniel Mendoza     Alcohol abuse Father Daniel Mendoza     Blood clot Father Daniel Mendoza         Allergies:  No Known Allergies     Outpatient Medications:  Current Outpatient Medications   Medication Instructions    aspirin 81 mg, oral, Daily    cholecalciferol, vitamin D3, 50 mcg (2,000 unit) tablet,chewable oral, Daily    cyanocobalamin (Vitamin B-12) 1,000 mcg tablet oral, Daily    fish oil concentrate (Omega-3) 120-180 mg capsule 1 g, oral, Daily    glucosamine sul-chondroitn-msm 500-200-150 mg tablet oral    Repatha SureClick 140 mg/mL injection INJECT 140 MG UNDER THE SKIN EVERY OTHER WEEK        Recent Lab Results:    CBC:   Lab Results   Component Value Date    WBC 4.5 05/28/2024    RBC 4.59 05/28/2024    HGB 15.1 05/28/2024    HCT 46.5 05/28/2024     05/28/2024        CMP:    Lab Results   Component Value Date     05/28/2024    K 4.5 05/28/2024     05/28/2024    CO2 29 05/28/2024    BUN 24 (H) 05/28/2024    CREATININE 1.20 05/28/2024    GLUCOSE 103 (H) 05/28/2024    CALCIUM 9.6 05/28/2024       Magnesium:    No results found for: \"MG\"    Lipid Profile:    Lab Results   Component Value Date    TRIG 100 05/28/2024    HDL 59.3 05/28/2024    LDLCALC 65 05/28/2024       TSH:    Lab Results   Component Value Date    TSH 1.27 07/18/2022       BNP:   No results found for: \"BNP\"     PT/INR:    Lab Results   Component Value Date    PROTIME 14.4 (H) 04/12/2023    INR 1.2 (H) 04/12/2023       HgBA1c:    Lab Results   Component Value Date    HGBA1C 5.6 05/28/2024       BMP:  Lab Results   Component Value Date     05/28/2024     04/17/2023     07/18/2022    K 4.5 05/28/2024    K 3.8 04/17/2023    K 4.5 07/18/2022     05/28/2024     04/17/2023     07/18/2022    CO2 29 05/28/2024    CO2 22 04/17/2023    CO2 29 " "07/18/2022    BUN 24 (H) 05/28/2024    BUN 23 04/17/2023    BUN 17 07/18/2022    CREATININE 1.20 05/28/2024    CREATININE 1.12 04/17/2023    CREATININE 1.23 07/18/2022       CBC:  Lab Results   Component Value Date    WBC 4.5 05/28/2024    WBC 7.2 04/17/2023    WBC 4.5 07/18/2022    RBC 4.59 05/28/2024    RBC 4.68 04/17/2023    RBC 4.75 07/18/2022    HGB 15.1 05/28/2024    HGB 15.0 04/17/2023    HGB 15.0 07/18/2022    HCT 46.5 05/28/2024    HCT 44.5 04/17/2023    HCT 45.9 07/18/2022     (H) 05/28/2024    MCV 95 04/17/2023    MCV 97 07/18/2022    MCH 32.9 05/28/2024    MCHC 32.5 05/28/2024    MCHC 33.7 04/17/2023    MCHC 32.7 07/18/2022    RDW 12.8 05/28/2024    RDW 12.3 04/17/2023    RDW 12.7 07/18/2022     05/28/2024     04/17/2023     07/18/2022       Cardiac Enzymes:    No results found for: \"TROPHS\"    Hepatic Function Panel:    Lab Results   Component Value Date    ALKPHOS 37 05/28/2024    ALT 16 05/28/2024    AST 17 05/28/2024    PROT 7.0 05/28/2024    BILITOT 0.5 05/28/2024    BILIDIR 0.1 09/14/2023         REVIEW OF SYSTEMS  Review of Systems   Constitutional: Negative.   Cardiovascular: Negative.    Respiratory: Negative.     Neurological: Negative.    All other systems reviewed and are negative.      VITALS  Vitals:    08/05/24 0800   BP: 138/74   Pulse: 72     Wt Readings from Last 4 Encounters:   08/05/24 64.7 kg (142 lb 9.3 oz)   06/04/24 65 kg (143 lb 6.4 oz)   12/04/23 65.5 kg (144 lb 6.4 oz)   09/13/23 65.9 kg (145 lb 6 oz)       PHYSICAL EXAM  Constitutional:       Appearance: Healthy appearance.   Eyes:      Pupils: Pupils are equal, round, and reactive to light.   Pulmonary:      Effort: Pulmonary effort is normal.      Breath sounds: Normal breath sounds.   Cardiovascular:      PMI at left midclavicular line. Normal rate. Regular rhythm.      Murmurs: There is no murmur.      No gallop.  No click. No rub.   Pulses:     Intact distal pulses.   Musculoskeletal: Normal " range of motion.      Cervical back: Normal range of motion. Skin:     General: Skin is warm and dry.   Neurological:      General: No focal deficit present.      Mental Status: Alert and oriented to person, place and time.

## 2024-08-05 NOTE — PATIENT INSTRUCTIONS
Continue same medications and treatments.   Patient educated on proper medication use.   Patient educated on risk factor modification.   Please bring any lab results from other providers / physicians to your next appointment.     Please bring all medicines, vitamins, and herbal supplements with you when you come to the office.     Prescriptions will not be filled unless you are compliant with your follow up appointments or have a follow up appointment scheduled as per instruction of your physician. Refills should be requested at the time of your visit.  1  year visit

## 2024-08-19 ENCOUNTER — HOSPITAL ENCOUNTER (OUTPATIENT)
Dept: RADIOLOGY | Facility: HOSPITAL | Age: 79
Discharge: HOME | End: 2024-08-19
Payer: MEDICARE

## 2024-08-19 ENCOUNTER — APPOINTMENT (OUTPATIENT)
Dept: PRIMARY CARE | Facility: CLINIC | Age: 79
End: 2024-08-19
Payer: MEDICARE

## 2024-08-19 VITALS
TEMPERATURE: 97.5 F | HEIGHT: 69 IN | SYSTOLIC BLOOD PRESSURE: 110 MMHG | WEIGHT: 141 LBS | BODY MASS INDEX: 20.88 KG/M2 | HEART RATE: 68 BPM | RESPIRATION RATE: 16 BRPM | DIASTOLIC BLOOD PRESSURE: 76 MMHG

## 2024-08-19 DIAGNOSIS — M25.561 CHRONIC PAIN OF RIGHT KNEE: ICD-10-CM

## 2024-08-19 DIAGNOSIS — G89.29 CHRONIC PAIN OF RIGHT KNEE: ICD-10-CM

## 2024-08-19 DIAGNOSIS — M25.551 PAIN OF RIGHT HIP: ICD-10-CM

## 2024-08-19 DIAGNOSIS — G58.9 MONONEUROPATHY: ICD-10-CM

## 2024-08-19 DIAGNOSIS — M25.551 PAIN OF RIGHT HIP: Primary | ICD-10-CM

## 2024-08-19 PROCEDURE — 99213 OFFICE O/P EST LOW 20 MIN: CPT | Performed by: FAMILY MEDICINE

## 2024-08-19 PROCEDURE — 1159F MED LIST DOCD IN RCRD: CPT | Performed by: FAMILY MEDICINE

## 2024-08-19 PROCEDURE — 1158F ADVNC CARE PLAN TLK DOCD: CPT | Performed by: FAMILY MEDICINE

## 2024-08-19 PROCEDURE — 73562 X-RAY EXAM OF KNEE 3: CPT | Mod: RIGHT SIDE | Performed by: RADIOLOGY

## 2024-08-19 PROCEDURE — 1123F ACP DISCUSS/DSCN MKR DOCD: CPT | Performed by: FAMILY MEDICINE

## 2024-08-19 PROCEDURE — 1036F TOBACCO NON-USER: CPT | Performed by: FAMILY MEDICINE

## 2024-08-19 PROCEDURE — 73562 X-RAY EXAM OF KNEE 3: CPT | Mod: RT

## 2024-08-19 PROCEDURE — 1160F RVW MEDS BY RX/DR IN RCRD: CPT | Performed by: FAMILY MEDICINE

## 2024-08-19 PROCEDURE — 73502 X-RAY EXAM HIP UNI 2-3 VIEWS: CPT | Mod: RIGHT SIDE | Performed by: RADIOLOGY

## 2024-08-19 PROCEDURE — 73502 X-RAY EXAM HIP UNI 2-3 VIEWS: CPT | Mod: RT

## 2024-08-19 ASSESSMENT — PATIENT HEALTH QUESTIONNAIRE - PHQ9
2. FEELING DOWN, DEPRESSED OR HOPELESS: NOT AT ALL
SUM OF ALL RESPONSES TO PHQ9 QUESTIONS 1 AND 2: 0
1. LITTLE INTEREST OR PLEASURE IN DOING THINGS: NOT AT ALL

## 2024-08-19 NOTE — PROGRESS NOTES
HPI: Yvon Mendoza is a 78 y.o. male presenting for Leg Pain (RIGHT LEG PAN)  I last saw the patient 6/4/2024      Patient is here for pain in his right leg  Patient Has had pain in his right leg for a few years but mentions it has gotten a lot worse recently, hurts to bend down, he describes it as a dull feeling from his hip down to the  bottom of his his and feels like knifes in his knee.    He mentions he has fallen down recently while walking his dog. He mentions having tingling and numbness in his foot. He says his knee is bothering him more and is waking him up at night.    Patient is taking glucosamine MSM and tumeric combination over the counter.    Past medical, surgical, and family history reviewed.  Reviewed and documented all medications   Pt eating well, exercising as tolerated and taking medications as directed      ROS    Except positives as noted in the CC & HPI   Constitutional: Denies fevers, chills, night sweats, fatigue, weight changes, change in appetite   Eyes: Denies blurry vision, double vision   ENT: Denies otalgia, trouble hearing, tinnitus, vertigo, nasal congestion, rhinorrhea, sore throat   Neck: Denies swelling, masses   Cardiovascular: Denies chest pain, palpitations, edema, orthopnea, syncope   Respiratory: Denies dyspnea, cough, wheezing, postural nocturnal dyspnea   Gastrointestinal: Denies abdominal pain, nausea, vomiting, diarrhea, constipation, melena, hematochezia   Genitourinary: Denies dysuria, hematuria, frequency, urgency   Musculoskeletal: Denies back pain, neck pain, arthralgias, myalgias   Integumentary: Denies skin lesions, rashes, masses   Neurological: Denies dizziness, headaches, confusion, limb weakness, paresthesias, syncope, convulsions   Psychiatric: Denies depression, anxiety, homicidal ideations, suicidal ideations, sleep disturbances   Endocrine: Denies polyphagia, polydipsia, polyuria, weakness, hair thinning, heat intolerance, cold intolerance, weight  "changes   Heme/Lymph: Denies easy bruising, easy bleeding, swollen glands     Vitals:    08/19/24 0934   BP: 110/76   BP Location: Left arm   Pulse: 68   Resp: 16   Temp: 36.4 °C (97.5 °F)   Weight: 64 kg (141 lb)   Height: 1.753 m (5' 9\")         PHYSICAL EXAM    GENERAL APPEARANCE: well-developed, well-nourished, 78 y.o. male in no acute distress.    SKIN: warm, pink and dry without rash or concerning lesions.    MENTAL STATUS: alert and oriented × 3. Normal mood and affect appropriate to mood.    Knee - Normal visual inspection, no erythema, warmth, or effusion. Normal knee alignment. Range of motion to flexion to 165 ° and extension to 0° of right knee. Range of motion to flexion to 170 ° and extension to 0° of left knee. Negative Lachman, anterior drawer test, valgus and varus stress testing. Doe's testing, patellar apprehension. No tenderness to palpation of MCL, LCL, medial joint line, lateral joint line, patellar tendon, quadriceps tendon, or pes bursa. Pain to palpation of the popliteal fossa. Sensation intact. Muscle strength +5/5. Gait normal. Normal range of motion of hips.     Hip -  normal straight leg raising except for tight hamstrings. 45 ° of external range of motion and 25 ° of internal range of motion of right hip. 55 ° of external range of motion and 30 ° of internal range of motion of motion of left hip. No pain to palpation of the inguinal crease.     EXTREMITIES: no cyanosis, clubbing or edema. Pedal pulses are 2+ normal at the dorsalis pedis and posterior tibial pulses bilaterally.    NEUROLOGICAL: cranial nerves II through XII are grossly intact. Motor strength 5/5 at all fours. DTRs are 2+ normal at all fours bilaterally and symmetrically.     Below is the patient's most recent value for Albumin, ALT, AST, BUN, Calcium, Chloride, Cholesterol, CO2, Creatinine, GFR, Glucose, HDL, Hematocrit, Hemoglobin, Hemoglobin A1C, LDL, Magnesium, Phosphorus, Platelets, Potassium, PSA, Sodium, " Triglycerides, and WBC.   Lab Results   Component Value Date    ALBUMIN 4.5 05/28/2024    ALT 16 05/28/2024    AST 17 05/28/2024    BUN 24 (H) 05/28/2024    CALCIUM 9.6 05/28/2024     05/28/2024    CHOL 144 05/28/2024    CO2 29 05/28/2024    CREATININE 1.20 05/28/2024    HDL 59.3 05/28/2024    HCT 46.5 05/28/2024    HGB 15.1 05/28/2024    HGBA1C 5.6 05/28/2024     05/28/2024    K 4.5 05/28/2024     05/28/2024    TRIG 100 05/28/2024    WBC 4.5 05/28/2024         ASSESSMENT:  1. Pain of right hip  XR hip right with pelvis when performed 2 or 3 views      2. Chronic pain of right knee  CANCELED: XR knee right 4+ views      3. Mononeuropathy            PLAN:  Patient to continue current medications (with any exceptions as noted) and diet. Follow-up in 1-2 month(s) otherwise as needed.      Ordered XR knee right 4+ views and XR hip right with pelvis when performed 2 or 3 views. Will call patient with results when available.      Consider Physical therapy if X-rays are unremarkable.    Consider EMG and NCV if symptoms persists.    Scribe Attestation  By signing my name below, ILisette , [employer]   attest that this documentation has been prepared under the direction and in the presence of Obinna Abdullahi MD.

## 2024-08-26 NOTE — RESULT ENCOUNTER NOTE
Please call the patient regarding his abnormal result.  Right knee x-ray reveals mild degenerative changes and trace suprapatellar effusion, otherwise normal.

## 2024-08-26 NOTE — RESULT ENCOUNTER NOTE
Please call the patient regarding his abnormal result.  X-ray of the right hip reveals mild narrowing of the hip joint consistent with mild degenerative changes.

## 2024-09-02 DIAGNOSIS — M25.552 PAIN OF LEFT HIP: Primary | ICD-10-CM

## 2024-09-17 ENCOUNTER — HOSPITAL ENCOUNTER (OUTPATIENT)
Dept: RADIOLOGY | Facility: HOSPITAL | Age: 79
Discharge: HOME | End: 2024-09-17
Payer: MEDICARE

## 2024-09-17 DIAGNOSIS — M25.552 PAIN OF LEFT HIP: ICD-10-CM

## 2024-09-17 PROCEDURE — 73502 X-RAY EXAM HIP UNI 2-3 VIEWS: CPT | Mod: LT

## 2024-09-17 PROCEDURE — 73502 X-RAY EXAM HIP UNI 2-3 VIEWS: CPT | Mod: LEFT SIDE | Performed by: RADIOLOGY

## 2024-12-04 ENCOUNTER — APPOINTMENT (OUTPATIENT)
Dept: PRIMARY CARE | Facility: CLINIC | Age: 79
End: 2024-12-04
Payer: MEDICARE

## 2024-12-04 VITALS
HEIGHT: 69 IN | BODY MASS INDEX: 20.59 KG/M2 | SYSTOLIC BLOOD PRESSURE: 126 MMHG | DIASTOLIC BLOOD PRESSURE: 68 MMHG | RESPIRATION RATE: 16 BRPM | TEMPERATURE: 97.2 F | OXYGEN SATURATION: 98 % | HEART RATE: 76 BPM | WEIGHT: 139 LBS

## 2024-12-04 DIAGNOSIS — R33.9 INCOMPLETE EMPTYING OF BLADDER: ICD-10-CM

## 2024-12-04 DIAGNOSIS — E78.2 MIXED HYPERLIPIDEMIA: ICD-10-CM

## 2024-12-04 DIAGNOSIS — I25.10 ATHEROSCLEROSIS OF NATIVE CORONARY ARTERY OF NATIVE HEART WITHOUT ANGINA PECTORIS: ICD-10-CM

## 2024-12-04 DIAGNOSIS — Z00.00 ENCOUNTER FOR ANNUAL WELLNESS VISIT (AWV) IN MEDICARE PATIENT: Primary | ICD-10-CM

## 2024-12-04 DIAGNOSIS — R73.9 HYPERGLYCEMIA: ICD-10-CM

## 2024-12-04 DIAGNOSIS — R35.0 URINARY FREQUENCY: ICD-10-CM

## 2024-12-04 PROCEDURE — 1123F ACP DISCUSS/DSCN MKR DOCD: CPT | Performed by: FAMILY MEDICINE

## 2024-12-04 PROCEDURE — G0439 PPPS, SUBSEQ VISIT: HCPCS | Performed by: FAMILY MEDICINE

## 2024-12-04 PROCEDURE — 1170F FXNL STATUS ASSESSED: CPT | Performed by: FAMILY MEDICINE

## 2024-12-04 PROCEDURE — 87086 URINE CULTURE/COLONY COUNT: CPT

## 2024-12-04 PROCEDURE — 1159F MED LIST DOCD IN RCRD: CPT | Performed by: FAMILY MEDICINE

## 2024-12-04 PROCEDURE — 1036F TOBACCO NON-USER: CPT | Performed by: FAMILY MEDICINE

## 2024-12-04 PROCEDURE — 1160F RVW MEDS BY RX/DR IN RCRD: CPT | Performed by: FAMILY MEDICINE

## 2024-12-04 PROCEDURE — 99213 OFFICE O/P EST LOW 20 MIN: CPT | Performed by: FAMILY MEDICINE

## 2024-12-04 ASSESSMENT — ACTIVITIES OF DAILY LIVING (ADL)
DOING_HOUSEWORK: INDEPENDENT
BATHING: INDEPENDENT
GROCERY_SHOPPING: INDEPENDENT
MANAGING_FINANCES: INDEPENDENT
TAKING_MEDICATION: INDEPENDENT
DRESSING: INDEPENDENT

## 2024-12-04 ASSESSMENT — PATIENT HEALTH QUESTIONNAIRE - PHQ9
SUM OF ALL RESPONSES TO PHQ9 QUESTIONS 1 AND 2: 0
2. FEELING DOWN, DEPRESSED OR HOPELESS: NOT AT ALL
1. LITTLE INTEREST OR PLEASURE IN DOING THINGS: NOT AT ALL

## 2024-12-04 NOTE — PROGRESS NOTES
"Subjective   Reason for Visit: Yvon Mendoza is an 79 y.o. male here for a Medicare Wellness visit.     Past Medical, Surgical, and Family History reviewed and updated in chart.    Reviewed all medications by prescribing practitioner or clinical pharmacist (such as prescriptions, OTCs, herbal therapies and supplements) and documented in the medical record.    HPI    Patient Care Team:  Obinna Abdullahi MD as PCP - General (Family Medicine)  Obinna Abdullahi MD as PCP - [facility] Attributed Provider     Review of Systems    Objective   Vitals:  /68 (BP Location: Right arm, Patient Position: Sitting, BP Cuff Size: Adult long)   Pulse 76   Temp 36.2 °C (97.2 °F)   Resp 16   Ht 1.753 m (5' 9\")   Wt 63 kg (139 lb)   SpO2 98%   BMI 20.53 kg/m²       Physical Exam    Assessment & Plan  Encounter for annual wellness visit (AWV) in Medicare patient    Orders:    Follow Up In [facility] - PCP - Medicare Annual; Future    Urinary frequency    Orders:    Urinalysis with Reflex Microscopic; Future    Urine Culture; Future    US renal complete; Future    Incomplete emptying of bladder    Orders:    US renal complete; Future    Mixed hyperlipidemia         Atherosclerosis of native coronary artery of native heart without angina pectoris         Hyperglycemia                   "

## 2024-12-04 NOTE — PROGRESS NOTES
HPI: Yvon Mendoza is a 79 y.o. male presenting for Medicare Annual Wellness Visit Subsequent  I last saw the patient 8/19/2024    Patient is here for a AWV  Pt already had flu vaccine    Past medical, surgical, and family history reviewed.  Reviewed and documented all medications   Pt eating well, exercising as tolerated and taking medications as directed    Patient mentions having increased frequency of urination and feels like he is not emptying his bladder completely. He denies burning micturition/ hematuria. Patient says he has tried Tamsulosin in the past in April 2023 but then it was stopped by his specialist.    Patient mentions monitoring his blood pressures at home and are usually in the normal range.    ROS    Except positives as noted in the CC & HPI   Constitutional: Denies fevers, chills, night sweats, fatigue, weight changes, change in appetite   Eyes: Denies blurry vision, double vision   ENT: Denies otalgia, trouble hearing, tinnitus, vertigo, nasal congestion, rhinorrhea, sore throat   Neck: Denies swelling, masses   Cardiovascular: Denies chest pain, palpitations, edema, orthopnea, syncope   Respiratory: Denies dyspnea, cough, wheezing, postural nocturnal dyspnea   Gastrointestinal: Denies abdominal pain, nausea, vomiting, diarrhea, constipation, melena, hematochezia   Genitourinary: Denies dysuria, hematuria, frequency, urgency   Musculoskeletal: Denies back pain, neck pain, arthralgias, myalgias   Integumentary: Denies skin lesions, rashes, masses   Neurological: Denies dizziness, headaches, confusion, limb weakness, paresthesias, syncope, convulsions   Psychiatric: Denies depression, anxiety, homicidal ideations, suicidal ideations, sleep disturbances   Endocrine: Denies polyphagia, polydipsia, polyuria, weakness, hair thinning, heat intolerance, cold intolerance, weight changes   Heme/Lymph: Denies easy bruising, easy bleeding, swollen glands     Vitals:    12/04/24 0910 12/04/24 0943   BP:  "136/72 126/68   BP Location:  Right arm   Patient Position:  Sitting   BP Cuff Size:  Adult long   Pulse: 76    Resp: 16    Temp: 36.2 °C (97.2 °F)    SpO2: 98%    Weight: 63 kg (139 lb)    Height: 1.753 m (5' 9\")      PHYSICAL EXAM    126/68 on recheck of BP in the right arm.     GENERAL APPEARANCE: well-developed, well-nourished, 79 y.o. male in no acute distress.    SKIN: warm, pink and dry without rash or concerning lesions.    MENTAL STATUS: alert and oriented × 3. Normal mood and affect appropriate to mood.    Ears - TMs shiny and move well with insufflation. Ear canals are clear bilaterally.      NECK: supple without lymphadenopathy. Carotid pulses are normal without bruits. Thyroid - is normal in midline without nodules.     CHEST - lungs are clear to auscultation without rales, rhonchi or wheezes.     HEART - regular, rate, and rhythm without murmurs, rubs or gallops.     ABDOMEN - soft, flat, nontender, nondistended. No masses, hepatomegaly or splenomegaly is noted. No rebound, rigidity or guarding is noted. Bowel sounds are normoactive.     RECTAL - normal anal opening. Normal sphincter tone. SANDEEP revealed very mildly enlarged prostate without nodularity. Stool in the rectal vault was brown and guaiac negative. No masses appreciated.     EXTREMITIES: no cyanosis, clubbing or edema. Pedal pulses are 2+ normal at the dorsalis pedis and posterior tibial pulses bilaterally.    NEUROLOGICAL: cranial nerves II through XII are grossly intact. Motor strength 5/5 at all fours.     Below is the patient's most recent value for Albumin, ALT, AST, BUN, Calcium, Chloride, Cholesterol, CO2, Creatinine, GFR, Glucose, HDL, Hematocrit, Hemoglobin, Hemoglobin A1C, LDL, Magnesium, Phosphorus, Platelets, Potassium, PSA, Sodium, Triglycerides, and WBC.   Lab Results   Component Value Date    ALBUMIN 4.5 05/28/2024    ALT 16 05/28/2024    AST 17 05/28/2024    BUN 24 (H) 05/28/2024    CALCIUM 9.6 05/28/2024     05/28/2024 "    CHOL 144 05/28/2024    CO2 29 05/28/2024    CREATININE 1.20 05/28/2024    HDL 59.3 05/28/2024    HCT 46.5 05/28/2024    HGB 15.1 05/28/2024    HGBA1C 5.6 05/28/2024     05/28/2024    K 4.5 05/28/2024     05/28/2024    TRIG 100 05/28/2024    WBC 4.5 05/28/2024         ASSESSMENT:  1. Encounter for annual wellness visit (AWV) in Medicare patient        2. Urinary frequency        3. Incomplete emptying of bladder        4. Mixed hyperlipidemia        5. Atherosclerosis of native coronary artery of native heart without angina pectoris        6. Hyperglycemia            PLAN:  Patient to continue current medications (with any exceptions as noted) and diet. Follow-up in 12 month(s) otherwise as needed.      Ordered Urinalysis with Reflex Microscopic and Urine Culture. Will call patient with results when available.      Ordered Renal US with PVR to evaluate urinary frequency and incomplete emptying. Will call patient with results when available.      Patient is to follow up with cardiology as scheduled.     Scribe Attestation  By signing my name below, ILisette , [employer]   attest that this documentation has been prepared under the direction and in the presence of Obinna Abdullahi MD.

## 2024-12-06 LAB — BACTERIA UR CULT: NO GROWTH

## 2024-12-09 ENCOUNTER — OFFICE VISIT (OUTPATIENT)
Dept: PRIMARY CARE | Facility: CLINIC | Age: 79
End: 2024-12-09
Payer: MEDICARE

## 2024-12-09 VITALS
RESPIRATION RATE: 16 BRPM | HEART RATE: 80 BPM | OXYGEN SATURATION: 98 % | WEIGHT: 140 LBS | SYSTOLIC BLOOD PRESSURE: 142 MMHG | DIASTOLIC BLOOD PRESSURE: 78 MMHG | HEIGHT: 69 IN | BODY MASS INDEX: 20.73 KG/M2 | TEMPERATURE: 97.7 F

## 2024-12-09 DIAGNOSIS — R05.8 PRODUCTIVE COUGH: ICD-10-CM

## 2024-12-09 DIAGNOSIS — J34.89 NASAL CONGESTION WITH RHINORRHEA: ICD-10-CM

## 2024-12-09 DIAGNOSIS — R09.81 NASAL CONGESTION WITH RHINORRHEA: ICD-10-CM

## 2024-12-09 DIAGNOSIS — J00 NASOPHARYNGITIS: Primary | ICD-10-CM

## 2024-12-09 PROCEDURE — 99212 OFFICE O/P EST SF 10 MIN: CPT | Performed by: NURSE PRACTITIONER

## 2024-12-09 RX ORDER — CEFDINIR 300 MG/1
300 CAPSULE ORAL 2 TIMES DAILY
Qty: 20 CAPSULE | Refills: 0 | Status: SHIPPED | OUTPATIENT
Start: 2024-12-09 | End: 2024-12-19

## 2024-12-09 RX ORDER — BROMPHENIRAMINE MALEATE, PSEUDOEPHEDRINE HYDROCHLORIDE, AND DEXTROMETHORPHAN HYDROBROMIDE 2; 30; 10 MG/5ML; MG/5ML; MG/5ML
5 SYRUP ORAL 4 TIMES DAILY PRN
Qty: 120 ML | Refills: 1 | Status: SHIPPED | OUTPATIENT
Start: 2024-12-09 | End: 2024-12-19

## 2024-12-09 NOTE — PROGRESS NOTES
"Subjective   Patient ID: Yvon Mendoza is a 79 y.o. male who presents for URI.      Symptoms: sore throat runny nose, chest congestion, headaches, ear ache, trouble swallowing chest pain when coughing  Length of symptoms:   3 days ago  OTC: acetaminophen, cough syrup,   Related information:    HPI     Review of Systems    Objective   /78 Comment: auto  Pulse 80   Temp 36.5 °C (97.7 °F)   Resp 16   Ht 1.753 m (5' 9\")   Wt 63.5 kg (140 lb)   SpO2 98%   BMI 20.67 kg/m²     Physical Exam    Assessment/Plan          "

## 2024-12-09 NOTE — PROGRESS NOTES
Subjective   Patient ID: Yvon Mendoza is a 79 y.o. male who is with a chief complaint of symptoms of respiratory tract infection.     HPI  Patient is a 79 y.o. male who CONSULTED AT [facility] today. Patient is with complaints of nasal congestion, nasal discharge, headache, mild sinus pain, sore throat, post nasal drip, productive cough, fatigue, and muscle ache. He denies having any loss of sense of taste, loss of sense of smell, diarrhea, chills nor fever. Patient states that present condition started about 4 days ago. Patient denies history of recent travel, exposure to person/people who tested positive for COVID 19, nor exposure to person/people with flu like symptoms. he denies shortness of breath, chest pain, palpitations, nor edema. he stated that he  tried OTC medications which afforded only slight relief of symptoms. he denies nausea, vomiting, abdominal pain, nor any other symptoms.    Patient states he had his COVID vaccine.  Patient states he had the flu shot for this season.    Review of Systems  General: no weight loss, generally healthy, (+) fatigue  Head: (+) headache, (+) mild sinus pain, no vertigo, no injury  Eyes: no diplopia, no tearing, no pain,   Ears: no change in hearing, no tinnitus, no bleeding, no vertigo  Mouth:  no dental difficulties, no gingival bleeding, (+) sore throat, no loss of sense of taste, (+) post nasal drip,   Nose: (+) congestion, (+) discharge, no bleeding, no obstruction, no loss of sense of smell  Neck: no stiffness, no pain, no tenderness, no masses, no bruit  Pulmonary: no dyspnea, no wheezing, no hemoptysis, (+) productive cough  Cardiovascular: no chest pain, no palpitations, no syncope, no orthopnea  Gastrointestinal: no change in appetite, no dysphagia, no abdominal pains, no diarrhea, no emesis, no melena  Genito Urinary: no dysuria, no urinary urgency, no nocturia, no incontinence, no change in nature of urine  Musculoskeletal: (+)  muscle ache, no joint pain, no limitation of range of motion, no paresthesia, no numbness  Constitutional: no fever, no chills, no night sweats    Objective   Physical Exam  General: ambulatory, in no acute distress  Head: normocephalic, no lesions, no sinus tenderness  Eyes: pink palpebral conjunctiva, anicteric sclerae, PERRLA, EOM's full  Ears: clear external auditory canals, no ear discharge, no bleeding from the ears, tympanic membrane intact  Nose: (+) congested nasal mucosa, (+) yellow mucoid nasal discharge, no bleeding, no obstruction  Throat: (+) erythema, and (+) exudate on posterior pharyngeal wall, no lesion  Neck: supple, no masses, no bruits, no CLADP  Chest: symmetrical chest expansion, no lagging, no retractions, clear breath sounds, no rales, no wheezes    Assessment/Plan   Problem List Items Addressed This Visit    None  Visit Diagnoses         Codes    Nasopharyngitis    -  Primary J00    Relevant Medications    cefdinir (Omnicef) 300 mg capsule    brompheniramine-pseudoeph-DM 2-30-10 mg/5 mL syrup    Nasal congestion with rhinorrhea     R09.81, J34.89    Relevant Medications    cefdinir (Omnicef) 300 mg capsule    brompheniramine-pseudoeph-DM 2-30-10 mg/5 mL syrup    Productive cough     R05.8    Relevant Medications    cefdinir (Omnicef) 300 mg capsule    brompheniramine-pseudoeph-DM 2-30-10 mg/5 mL syrup        DISCHARGE SUMMARY:   Patient was seen and examined. Diagnosis, treatment, treatment options, and possible complications of today's illness discussed and explained to patient. Patient to take medication/s associated with this visit. Patient may also take OTC analgesic/antipyretic if needed for pain/fever. Advised to increase oral fluid intake. Advised steam inhalation if needed to relieve congestion. Advised warm saline gargle if needed to relieve throat discomfort. Advised Listerine antiseptic mouthwash gargle TID. Patient may use Cepacol oral spray as needed to relieve throat  discomfort. Patient was advised to discard the old toothbrush and use a new toothbrush beginning on the third of antibiotics. Advised to come back if with worsening or persistent symptoms. Patient verbalized understanding of plan of care.    Patient to come back in 7 - 10 days if needed for worsening symptoms.           MOE Grimes-CNP 12/09/24 5:45 PM

## 2024-12-10 ENCOUNTER — APPOINTMENT (OUTPATIENT)
Dept: RADIOLOGY | Facility: HOSPITAL | Age: 79
End: 2024-12-10
Payer: MEDICARE

## 2024-12-10 ASSESSMENT — ENCOUNTER SYMPTOMS
OCCASIONAL FEELINGS OF UNSTEADINESS: 0
LOSS OF SENSATION IN FEET: 0
DEPRESSION: 0

## 2024-12-16 ENCOUNTER — APPOINTMENT (OUTPATIENT)
Dept: RADIOLOGY | Facility: HOSPITAL | Age: 79
End: 2024-12-16
Payer: MEDICARE

## 2024-12-16 DIAGNOSIS — R30.0 DYSURIA: ICD-10-CM

## 2024-12-16 DIAGNOSIS — R33.9 INCOMPLETE EMPTYING OF BLADDER: ICD-10-CM

## 2024-12-16 DIAGNOSIS — R35.0 URINARY FREQUENCY: Primary | ICD-10-CM

## 2024-12-17 ENCOUNTER — LAB (OUTPATIENT)
Dept: LAB | Facility: LAB | Age: 79
End: 2024-12-17
Payer: MEDICARE

## 2024-12-17 ENCOUNTER — APPOINTMENT (OUTPATIENT)
Dept: RADIOLOGY | Facility: HOSPITAL | Age: 79
End: 2024-12-17
Payer: MEDICARE

## 2024-12-17 DIAGNOSIS — R30.0 DYSURIA: ICD-10-CM

## 2024-12-17 DIAGNOSIS — R33.9 INCOMPLETE EMPTYING OF BLADDER: ICD-10-CM

## 2024-12-17 DIAGNOSIS — R35.0 URINARY FREQUENCY: ICD-10-CM

## 2024-12-17 LAB
APPEARANCE UR: CLEAR
BILIRUB UR STRIP.AUTO-MCNC: NEGATIVE MG/DL
COLOR UR: NORMAL
GLUCOSE UR STRIP.AUTO-MCNC: NORMAL MG/DL
KETONES UR STRIP.AUTO-MCNC: NEGATIVE MG/DL
LEUKOCYTE ESTERASE UR QL STRIP.AUTO: NEGATIVE
NITRITE UR QL STRIP.AUTO: NEGATIVE
PH UR STRIP.AUTO: 6 [PH]
PROT UR STRIP.AUTO-MCNC: NEGATIVE MG/DL
RBC # UR STRIP.AUTO: NEGATIVE /UL
SP GR UR STRIP.AUTO: 1.01
UROBILINOGEN UR STRIP.AUTO-MCNC: NORMAL MG/DL

## 2024-12-17 PROCEDURE — 87086 URINE CULTURE/COLONY COUNT: CPT

## 2024-12-17 PROCEDURE — 81003 URINALYSIS AUTO W/O SCOPE: CPT

## 2024-12-18 ENCOUNTER — APPOINTMENT (OUTPATIENT)
Dept: CARDIOLOGY | Facility: HOSPITAL | Age: 79
End: 2024-12-18
Payer: MEDICARE

## 2024-12-18 ENCOUNTER — PATIENT MESSAGE (OUTPATIENT)
Dept: PRIMARY CARE | Facility: CLINIC | Age: 79
End: 2024-12-18
Payer: MEDICARE

## 2024-12-18 ENCOUNTER — APPOINTMENT (OUTPATIENT)
Dept: RADIOLOGY | Facility: HOSPITAL | Age: 79
End: 2024-12-18
Payer: MEDICARE

## 2024-12-18 ENCOUNTER — HOSPITAL ENCOUNTER (EMERGENCY)
Facility: HOSPITAL | Age: 79
Discharge: HOME | End: 2024-12-18
Payer: MEDICARE

## 2024-12-18 VITALS
HEART RATE: 66 BPM | HEIGHT: 69 IN | WEIGHT: 134 LBS | SYSTOLIC BLOOD PRESSURE: 180 MMHG | OXYGEN SATURATION: 96 % | TEMPERATURE: 97.7 F | RESPIRATION RATE: 18 BRPM | BODY MASS INDEX: 19.85 KG/M2 | DIASTOLIC BLOOD PRESSURE: 91 MMHG

## 2024-12-18 DIAGNOSIS — R33.9 INCOMPLETE EMPTYING OF BLADDER: ICD-10-CM

## 2024-12-18 DIAGNOSIS — R39.11 URINARY HESITANCY: ICD-10-CM

## 2024-12-18 DIAGNOSIS — R33.8 ACUTE RETENTION OF URINE: ICD-10-CM

## 2024-12-18 DIAGNOSIS — K59.00 CONSTIPATION, UNSPECIFIED CONSTIPATION TYPE: Primary | ICD-10-CM

## 2024-12-18 LAB
ALBUMIN SERPL BCP-MCNC: 4.2 G/DL (ref 3.4–5)
ALP SERPL-CCNC: 46 U/L (ref 33–136)
ALT SERPL W P-5'-P-CCNC: 19 U/L (ref 10–52)
ANION GAP SERPL CALC-SCNC: 12 MMOL/L (ref 10–20)
APPEARANCE UR: CLEAR
AST SERPL W P-5'-P-CCNC: 19 U/L (ref 9–39)
ATRIAL RATE: 70 BPM
BACTERIA UR CULT: NO GROWTH
BASOPHILS # BLD AUTO: 0.03 X10*3/UL (ref 0–0.1)
BASOPHILS NFR BLD AUTO: 0.4 %
BILIRUB SERPL-MCNC: 0.5 MG/DL (ref 0–1.2)
BILIRUB UR STRIP.AUTO-MCNC: NEGATIVE MG/DL
BNP SERPL-MCNC: 62 PG/ML (ref 0–99)
BUN SERPL-MCNC: 19 MG/DL (ref 6–23)
CALCIUM SERPL-MCNC: 9.9 MG/DL (ref 8.6–10.3)
CARDIAC TROPONIN I PNL SERPL HS: 3 NG/L (ref 0–20)
CARDIAC TROPONIN I PNL SERPL HS: 5 NG/L (ref 0–20)
CHLORIDE SERPL-SCNC: 104 MMOL/L (ref 98–107)
CO2 SERPL-SCNC: 26 MMOL/L (ref 21–32)
COLOR UR: ABNORMAL
CREAT SERPL-MCNC: 1.36 MG/DL (ref 0.5–1.3)
EGFRCR SERPLBLD CKD-EPI 2021: 53 ML/MIN/1.73M*2
EOSINOPHIL # BLD AUTO: 0.08 X10*3/UL (ref 0–0.4)
EOSINOPHIL NFR BLD AUTO: 1.2 %
ERYTHROCYTE [DISTWIDTH] IN BLOOD BY AUTOMATED COUNT: 12 % (ref 11.5–14.5)
FLUAV RNA RESP QL NAA+PROBE: NOT DETECTED
FLUBV RNA RESP QL NAA+PROBE: NOT DETECTED
GLUCOSE SERPL-MCNC: 116 MG/DL (ref 74–99)
GLUCOSE UR STRIP.AUTO-MCNC: NORMAL MG/DL
HCT VFR BLD AUTO: 44.8 % (ref 41–52)
HETEROPH AB SERPLBLD QL IA.RAPID: NEGATIVE
HGB BLD-MCNC: 15.2 G/DL (ref 13.5–17.5)
HOLD SPECIMEN: NORMAL
IMM GRANULOCYTES # BLD AUTO: 0.02 X10*3/UL (ref 0–0.5)
IMM GRANULOCYTES NFR BLD AUTO: 0.3 % (ref 0–0.9)
KETONES UR STRIP.AUTO-MCNC: ABNORMAL MG/DL
LACTATE SERPL-SCNC: 1.1 MMOL/L (ref 0.4–2)
LEUKOCYTE ESTERASE UR QL STRIP.AUTO: NEGATIVE
LIPASE SERPL-CCNC: 28 U/L (ref 9–82)
LYMPHOCYTES # BLD AUTO: 1.25 X10*3/UL (ref 0.8–3)
LYMPHOCYTES NFR BLD AUTO: 18.5 %
MCH RBC QN AUTO: 32.4 PG (ref 26–34)
MCHC RBC AUTO-ENTMCNC: 33.9 G/DL (ref 32–36)
MCV RBC AUTO: 96 FL (ref 80–100)
MONOCYTES # BLD AUTO: 0.72 X10*3/UL (ref 0.05–0.8)
MONOCYTES NFR BLD AUTO: 10.6 %
NEUTROPHILS # BLD AUTO: 4.67 X10*3/UL (ref 1.6–5.5)
NEUTROPHILS NFR BLD AUTO: 69 %
NITRITE UR QL STRIP.AUTO: NEGATIVE
NRBC BLD-RTO: 0 /100 WBCS (ref 0–0)
P AXIS: 77 DEGREES
P OFFSET: 200 MS
P ONSET: 148 MS
PH UR STRIP.AUTO: 6 [PH]
PLATELET # BLD AUTO: 260 X10*3/UL (ref 150–450)
POTASSIUM SERPL-SCNC: 4.2 MMOL/L (ref 3.5–5.3)
PR INTERVAL: 154 MS
PROT SERPL-MCNC: 7.5 G/DL (ref 6.4–8.2)
PROT UR STRIP.AUTO-MCNC: NEGATIVE MG/DL
Q ONSET: 225 MS
QRS COUNT: 12 BEATS
QRS DURATION: 90 MS
QT INTERVAL: 434 MS
QTC CALCULATION(BAZETT): 468 MS
QTC FREDERICIA: 457 MS
R AXIS: 42 DEGREES
RBC # BLD AUTO: 4.69 X10*6/UL (ref 4.5–5.9)
RBC # UR STRIP.AUTO: NEGATIVE /UL
RSV RNA RESP QL NAA+PROBE: NOT DETECTED
S PYO DNA THROAT QL NAA+PROBE: NOT DETECTED
SARS-COV-2 RNA RESP QL NAA+PROBE: NOT DETECTED
SODIUM SERPL-SCNC: 138 MMOL/L (ref 136–145)
SP GR UR STRIP.AUTO: 1.05
T AXIS: 13 DEGREES
T OFFSET: 442 MS
UROBILINOGEN UR STRIP.AUTO-MCNC: NORMAL MG/DL
VENTRICULAR RATE: 70 BPM
WBC # BLD AUTO: 6.8 X10*3/UL (ref 4.4–11.3)

## 2024-12-18 PROCEDURE — 86308 HETEROPHILE ANTIBODY SCREEN: CPT | Performed by: PHYSICIAN ASSISTANT

## 2024-12-18 PROCEDURE — 2550000001 HC RX 255 CONTRASTS: Performed by: PHYSICIAN ASSISTANT

## 2024-12-18 PROCEDURE — 36415 COLL VENOUS BLD VENIPUNCTURE: CPT | Performed by: PHYSICIAN ASSISTANT

## 2024-12-18 PROCEDURE — 83605 ASSAY OF LACTIC ACID: CPT | Performed by: PHYSICIAN ASSISTANT

## 2024-12-18 PROCEDURE — 85025 COMPLETE CBC W/AUTO DIFF WBC: CPT | Performed by: PHYSICIAN ASSISTANT

## 2024-12-18 PROCEDURE — 83690 ASSAY OF LIPASE: CPT | Performed by: PHYSICIAN ASSISTANT

## 2024-12-18 PROCEDURE — 74177 CT ABD & PELVIS W/CONTRAST: CPT

## 2024-12-18 PROCEDURE — 93005 ELECTROCARDIOGRAM TRACING: CPT

## 2024-12-18 PROCEDURE — 80053 COMPREHEN METABOLIC PANEL: CPT | Performed by: PHYSICIAN ASSISTANT

## 2024-12-18 PROCEDURE — 87637 SARSCOV2&INF A&B&RSV AMP PRB: CPT | Performed by: PHYSICIAN ASSISTANT

## 2024-12-18 PROCEDURE — 84484 ASSAY OF TROPONIN QUANT: CPT | Performed by: PHYSICIAN ASSISTANT

## 2024-12-18 PROCEDURE — 83880 ASSAY OF NATRIURETIC PEPTIDE: CPT | Performed by: PHYSICIAN ASSISTANT

## 2024-12-18 PROCEDURE — 87651 STREP A DNA AMP PROBE: CPT | Performed by: PHYSICIAN ASSISTANT

## 2024-12-18 PROCEDURE — 99285 EMERGENCY DEPT VISIT HI MDM: CPT | Mod: 25

## 2024-12-18 PROCEDURE — 81003 URINALYSIS AUTO W/O SCOPE: CPT | Performed by: PHYSICIAN ASSISTANT

## 2024-12-18 PROCEDURE — 71275 CT ANGIOGRAPHY CHEST: CPT | Mod: FOREIGN READ | Performed by: RADIOLOGY

## 2024-12-18 PROCEDURE — 74177 CT ABD & PELVIS W/CONTRAST: CPT | Mod: FOREIGN READ | Performed by: RADIOLOGY

## 2024-12-18 PROCEDURE — 71275 CT ANGIOGRAPHY CHEST: CPT

## 2024-12-18 RX ORDER — TAMSULOSIN HYDROCHLORIDE 0.4 MG/1
0.4 CAPSULE ORAL DAILY
Qty: 30 CAPSULE | Refills: 5 | Status: SHIPPED | OUTPATIENT
Start: 2024-12-18 | End: 2025-12-18

## 2024-12-18 RX ADMIN — IOHEXOL 75 ML: 350 INJECTION, SOLUTION INTRAVENOUS at 12:08

## 2024-12-18 ASSESSMENT — COLUMBIA-SUICIDE SEVERITY RATING SCALE - C-SSRS
1. IN THE PAST MONTH, HAVE YOU WISHED YOU WERE DEAD OR WISHED YOU COULD GO TO SLEEP AND NOT WAKE UP?: NO
2. HAVE YOU ACTUALLY HAD ANY THOUGHTS OF KILLING YOURSELF?: NO
6. HAVE YOU EVER DONE ANYTHING, STARTED TO DO ANYTHING, OR PREPARED TO DO ANYTHING TO END YOUR LIFE?: NO

## 2024-12-18 ASSESSMENT — LIFESTYLE VARIABLES
EVER FELT BAD OR GUILTY ABOUT YOUR DRINKING: NO
HAVE YOU EVER FELT YOU SHOULD CUT DOWN ON YOUR DRINKING: NO
TOTAL SCORE: 0
HAVE PEOPLE ANNOYED YOU BY CRITICIZING YOUR DRINKING: NO
EVER HAD A DRINK FIRST THING IN THE MORNING TO STEADY YOUR NERVES TO GET RID OF A HANGOVER: NO

## 2024-12-18 ASSESSMENT — PAIN SCALES - GENERAL
PAINLEVEL_OUTOF10: 5 - MODERATE PAIN
PAINLEVEL_OUTOF10: 0 - NO PAIN
PAINLEVEL_OUTOF10: 0 - NO PAIN

## 2024-12-18 ASSESSMENT — PAIN - FUNCTIONAL ASSESSMENT: PAIN_FUNCTIONAL_ASSESSMENT: 0-10

## 2024-12-18 NOTE — ED PROVIDER NOTES
HPI   Chief Complaint   Patient presents with    Constipation     Pt states no bm in 3-4 days and having some discharge        An 79-year-old male patient comes in the emergency department today with multiple medical complaints.  He states that persistent upper respiratory symptoms including severe sore throat, cough for several weeks now.  States he has been on cefdinir and is almost done but still has a cough of the throat is gotten a little bit better.  He states he has developed over the last 3 to 4 days feeling of urinary urgency and feeling like he has to go to the bathroom all the time but often nothing comes out.  Also states that his bowel movements have been affected and he is hardly getting anything out when he tries to have a BM.  States he developed some upper abdominal discomfort as well.  Denies any nausea or vomiting denies any fevers.  For this purpose comes in the emergency department today further evaluation.  Otherwise no other complaints present time.              Patient History   Past Medical History:   Diagnosis Date    Atherosclerotic heart disease of native coronary artery without angina pectoris 07/14/2022    Atherosclerosis of native coronary artery without angina pectoris, unspecified whether native or transplanted heart    Disease of thyroid gland 1990    Heart disease October 2009    Hyperlipidemia      Past Surgical History:   Procedure Laterality Date    APPENDECTOMY  1950    CIRCUMCISION, PRIMARY  1944    CORONARY ARTERY BYPASS GRAFT  January 2010    HERNIA REPAIR  2011    OTHER SURGICAL HISTORY  07/14/2022    Coronary artery bypass graft    OTHER SURGICAL HISTORY  07/14/2022    Inguinal hernia repair laparoscopic    VASECTOMY  1990     Family History   Problem Relation Name Age of Onset    Hypertension Father Daniel Mendoza     Alcohol abuse Father Daniel Mendoza     Blood clot Father Daniel Mendoza      Social History     Tobacco Use    Smoking status: Never     Smokeless tobacco: Never   Vaping Use    Vaping status: Never Used   Substance Use Topics    Alcohol use: Yes     Alcohol/week: 7.0 standard drinks of alcohol     Types: 7 Glasses of wine per week     Comment: daily red wine    Drug use: Never       Physical Exam   ED Triage Vitals [12/18/24 0955]   Temperature Heart Rate Respirations BP   36.5 °C (97.7 °F) 84 16 155/86      Pulse Ox Temp src Heart Rate Source Patient Position   96 % -- -- --      BP Location FiO2 (%)     -- --       Physical Exam  Constitutional:       General: He is in acute distress (Mild).      Appearance: Normal appearance. He is not ill-appearing or diaphoretic.   HENT:      Head: Normocephalic and atraumatic.      Nose: Nose normal.   Eyes:      Extraocular Movements: Extraocular movements intact.      Conjunctiva/sclera: Conjunctivae normal.      Pupils: Pupils are equal, round, and reactive to light.   Cardiovascular:      Rate and Rhythm: Normal rate and regular rhythm.   Pulmonary:      Effort: Pulmonary effort is normal. No respiratory distress.      Breath sounds: Normal breath sounds. No stridor. No wheezing.   Abdominal:      Tenderness: There is no right CVA tenderness or left CVA tenderness.   Musculoskeletal:         General: Normal range of motion.      Cervical back: Normal range of motion.   Skin:     General: Skin is warm and dry.   Neurological:      General: No focal deficit present.      Mental Status: He is alert and oriented to person, place, and time. Mental status is at baseline.   Psychiatric:         Mood and Affect: Mood normal.           ED Course & MDM   Diagnoses as of 12/18/24 1853   Constipation, unspecified constipation type   Acute retention of urine                 No data recorded     Shannen Coma Scale Score: 15 (12/18/24 0956 : Rosette Bella RN)                           Medical Decision Making  An 79-year-old male patient comes in the emergency department today with multiple medical complaints.   He states that persistent upper respiratory symptoms including severe sore throat, cough for several weeks now.  States he has been on cefdinir and is almost done but still has a cough of the throat is gotten a little bit better.  He states he has developed over the last 3 to 4 days feeling of urinary urgency and feeling like he has to go to the bathroom all the time but often nothing comes out.  Also states that his bowel movements have been affected and he is hardly getting anything out when he tries to have a BM.  States he developed some upper abdominal discomfort as well.  Denies any nausea or vomiting denies any fevers.  For this purpose comes in the emergency department today further evaluation.  Otherwise no other complaints present time.    EKG, laboratory studies, CT PE study and CT abdomen pelvis ordered.  Rule out arrhythmias, ACS, electrolyte normality, leukocytosis, acute kidney injury, pneumonia, pneumothorax, pulmonary congestion, pleural effusion, pulmonary emboli, bowel obstruction, bowel mass, pancreatitis, cholecystitis, gastroenteritis.    Patient's urinalysis is negative, troponin negative, COVID-19 negative, influenza negative, RSV negative, BNP negative, mononucleosis negative, lipase negative, patient has a creatinine 1.36 GFR 53 which is a worsened for the patient.  Patient negative for strep pharyngitis lactate, no leukocytosis or left shift.  CT PE study shows no pulmonary emboli or inflammatory process, no pneumonia.  Patient CT of the abdomen pelvis is negative as well, patient does have a distended bladder.    This patient has been having symptoms of constipation with distended bladder had a bladder scan performed which showed the patient had over 300 mL.  Had the patient urinate did a postvoid and patient had only relieved maybe 14 mL was still retaining.  I considered that this is could be secondary to the patient's constipation and so soapsuds enema was then ordered and given.   Patient got significant relief with this and was able to have a large bowel movement.  I then had the patient void again.  Patient has approximately 30 to 40 mL.  I believe the patient's retention was secondary to the constipation.  Will have patient take some MiraLAX over the next several days and I will give name for urology in case he has any other urinary issues.  Patient agrees with this plan expressed verbal understanding.  Questions were answered.    Started the patient    Diagnosis: Constipation, acute urinary retention      Labs Reviewed   COMPREHENSIVE METABOLIC PANEL - Abnormal       Result Value    Glucose 116 (*)     Sodium 138      Potassium 4.2      Chloride 104      Bicarbonate 26      Anion Gap 12      Urea Nitrogen 19      Creatinine 1.36 (*)     eGFR 53 (*)     Calcium 9.9      Albumin 4.2      Alkaline Phosphatase 46      Total Protein 7.5      AST 19      Bilirubin, Total 0.5      ALT 19     URINALYSIS WITH REFLEX CULTURE AND MICROSCOPIC - Abnormal    Color, Urine Light-Yellow      Appearance, Urine Clear      Specific Gravity, Urine 1.047 (*)     pH, Urine 6.0      Protein, Urine NEGATIVE      Glucose, Urine Normal      Blood, Urine NEGATIVE      Ketones, Urine TRACE (*)     Bilirubin, Urine NEGATIVE      Urobilinogen, Urine Normal      Nitrite, Urine NEGATIVE      Leukocyte Esterase, Urine NEGATIVE     GROUP A STREPTOCOCCUS, PCR - Normal    Group A Strep PCR Not Detected     LIPASE - Normal    Lipase 28      Narrative:     Venipuncture immediately after or during the administration of Metamizole may lead to falsely low results. Testing should be performed immediately prior to Metamizole dosing.   LACTATE - Normal    Lactate 1.1      Narrative:     Venipuncture immediately after or during the administration of Metamizole may lead to falsely low results. Testing should be performed immediately prior to Metamizole dosing.   B-TYPE NATRIURETIC PEPTIDE - Normal    BNP 62      Narrative:        <100  pg/mL - Heart failure unlikely  100-299 pg/mL - Intermediate probability of acute heart                  failure exacerbation. Correlate with clinical                  context and patient history.    >=300 pg/mL - Heart Failure likely. Correlate with clinical                  context and patient history.    BNP testing is performed using different testing methodology at [facility] than at [facility]. Direct result comparisons should only be made within the same method.      SARS-COV-2 PCR - Normal    Coronavirus 2019, PCR Not Detected      Narrative:     This assay has received FDA Emergency Use Authorization (EUA) and is only authorized for the duration of time that circumstances exist to justify the authorization of the emergency use of in vitro diagnostic tests for the detection of SARS-CoV-2 virus and/or diagnosis of COVID-19 infection under section 564(b)(1) of the Act, 21 U.S.C. 360bbb-3(b)(1). This assay is an in vitro diagnostic nucleic acid amplification test for the qualitative detection of SARS-CoV-2 from nasopharyngeal specimens and has been validated for use at [facility]. Negative results do not preclude COVID-19 infections and should not be used as the sole basis for diagnosis, treatment, or other management decisions.     INFLUENZA A AND B PCR - Normal    Flu A Result Not Detected      Flu B Result Not Detected      Narrative:     This assay is an in vitro diagnostic multiplex nucleic acid amplification test for the detection and discrimination of Influenza A & B from nasopharyngeal specimens, and has been validated for use at [facility]. Negative results do not preclude Influenza A/B infections, and should not be used as the sole basis for diagnosis, treatment, or other management decisions. If Influenza A/B and RSV PCR results are negative, testing for Parainfluenza virus, Adenovirus and Metapneumovirus is routinely performed  for [facility] pediatric oncology and intensive care inpatients, and is available on other patients by placing an add-on request.   RSV PCR - Normal    RSV PCR Not Detected      Narrative:     This assay is an FDA-cleared, in vitro diagnostic nucleic acid amplification test for the detection of RSV from nasopharyngeal specimens, and has been validated for use at [facility]. Negative results do not preclude RSV infections, and should not be used as the sole basis for diagnosis, treatment, or other management decisions. If Influenza A/B and RSV PCR results are negative, testing for Parainfluenza virus, Adenovirus and Metapneumovirus is routinely performed for pediatric oncology and intensive care inpatients at [facility], and is available on other patients by placing an add-on request.       MONONUCLEOSIS SCREEN (HETEROPHILE ANTIBODY) - Normal    Mononucleosis Screen Negative     SERIAL TROPONIN-INITIAL - Normal    Troponin I, High Sensitivity 5      Narrative:     Less than 99th percentile of normal range cutoff-  Female and children under 18 years old <14 ng/L; Male <21 ng/L: Negative  Repeat testing should be performed if clinically indicated.     Female and children under 18 years old 14-50 ng/L; Male 21-50 ng/L:  Consistent with possible cardiac damage and possible increased clinical   risk. Serial measurements may help to assess extent of myocardial damage.     >50 ng/L: Consistent with cardiac damage, increased clinical risk and  myocardial infarction. Serial measurements may help assess extent of   myocardial damage.      NOTE: Children less than 1 year old may have higher baseline troponin   levels and results should be interpreted in conjunction with the overall   clinical context.     NOTE: Troponin I testing is performed using a different   testing methodology at [facility] than at other   [facility]. Direct result comparisons should only   be made within the same method.    SERIAL TROPONIN, 1 HOUR - Normal    Troponin I, High Sensitivity 3      Narrative:     Less than 99th percentile of normal range cutoff-  Female and children under 18 years old <14 ng/L; Male <21 ng/L: Negative  Repeat testing should be performed if clinically indicated.     Female and children under 18 years old 14-50 ng/L; Male 21-50 ng/L:  Consistent with possible cardiac damage and possible increased clinical   risk. Serial measurements may help to assess extent of myocardial damage.     >50 ng/L: Consistent with cardiac damage, increased clinical risk and  myocardial infarction. Serial measurements may help assess extent of   myocardial damage.      NOTE: Children less than 1 year old may have higher baseline troponin   levels and results should be interpreted in conjunction with the overall   clinical context.     NOTE: Troponin I testing is performed using a different   testing methodology at [facility] than at other   [facility]. Direct result comparisons should only   be made within the same method.   CBC WITH AUTO DIFFERENTIAL    WBC 6.8      nRBC 0.0      RBC 4.69      Hemoglobin 15.2      Hematocrit 44.8      MCV 96      MCH 32.4      MCHC 33.9      RDW 12.0      Platelets 260      Neutrophils % 69.0      Immature Granulocytes %, Automated 0.3      Lymphocytes % 18.5      Monocytes % 10.6      Eosinophils % 1.2      Basophils % 0.4      Neutrophils Absolute 4.67      Immature Granulocytes Absolute, Automated 0.02      Lymphocytes Absolute 1.25      Monocytes Absolute 0.72      Eosinophils Absolute 0.08      Basophils Absolute 0.03     TROPONIN SERIES- (INITIAL, 1 HR)    Narrative:     The following orders were created for panel order Troponin I Series, High Sensitivity (0, 1 HR).  Procedure                               Abnormality         Status                     ---------                               -----------         ------                     Troponin I, High  Sensiti...[[id number]]  Normal              Final result               Troponin, High Sensitivi...[[id number]]  Normal              Final result                 Please view results for these tests on the individual orders.   URINALYSIS WITH REFLEX CULTURE AND MICROSCOPIC    Narrative:     The following orders were created for panel order Urinalysis with Reflex Culture and Microscopic.  Procedure                               Abnormality         Status                     ---------                               -----------         ------                     Urinalysis with Reflex C...[[id number]]  Abnormal            Final result               Extra Urine Gray Tube[[id number]]                            In process                   Please view results for these tests on the individual orders.   EXTRA URINE GRAY TUBE        CT angio chest for pulmonary embolism   Final Result   *No pulmonary embolus is identified.   *No acute inflammatory process is identified.   *Please see above for more details.       Signed by Sotero Crook MD      CT abdomen pelvis w IV contrast   Final Result   *No pulmonary embolus is identified.   *No acute inflammatory process is identified.   *Please see above for more details.       Signed by Sotero Crook MD            Procedure  ECG 12 lead    Performed by: Magdy Andersen PA-C  Authorized by: Magdy Andersen PA-C    Interpretation:     Details:  My EKG interpretation  Rate:     ECG rate:  70  Rhythm:     Rhythm: sinus rhythm    ST segments:     ST segments:  Non-specific  Comments:      No STEMI       Magdy Andersen PA-C  12/18/24 1366

## 2024-12-23 ENCOUNTER — APPOINTMENT (OUTPATIENT)
Dept: PRIMARY CARE | Facility: CLINIC | Age: 79
End: 2024-12-23
Payer: MEDICARE

## 2024-12-23 VITALS
OXYGEN SATURATION: 98 % | HEART RATE: 67 BPM | WEIGHT: 135 LBS | DIASTOLIC BLOOD PRESSURE: 80 MMHG | RESPIRATION RATE: 16 BRPM | HEIGHT: 69 IN | BODY MASS INDEX: 19.99 KG/M2 | TEMPERATURE: 97.2 F | SYSTOLIC BLOOD PRESSURE: 130 MMHG

## 2024-12-23 DIAGNOSIS — R19.4 CHANGE IN BOWEL HABITS: ICD-10-CM

## 2024-12-23 DIAGNOSIS — K59.01 SLOW TRANSIT CONSTIPATION: ICD-10-CM

## 2024-12-23 DIAGNOSIS — R33.9 URINARY RETENTION: Primary | ICD-10-CM

## 2024-12-23 PROCEDURE — 99213 OFFICE O/P EST LOW 20 MIN: CPT | Performed by: FAMILY MEDICINE

## 2024-12-23 PROCEDURE — G2211 COMPLEX E/M VISIT ADD ON: HCPCS | Performed by: FAMILY MEDICINE

## 2024-12-23 PROCEDURE — 1160F RVW MEDS BY RX/DR IN RCRD: CPT | Performed by: FAMILY MEDICINE

## 2024-12-23 PROCEDURE — 1159F MED LIST DOCD IN RCRD: CPT | Performed by: FAMILY MEDICINE

## 2024-12-23 PROCEDURE — 1036F TOBACCO NON-USER: CPT | Performed by: FAMILY MEDICINE

## 2024-12-23 PROCEDURE — 1123F ACP DISCUSS/DSCN MKR DOCD: CPT | Performed by: FAMILY MEDICINE

## 2024-12-23 NOTE — PROGRESS NOTES
HPI: Yvon Mendoza is a 79 y.o. male presenting for Hospital Follow-up  I last saw the patient 12/12/2024    Pt went to ED on 12/18 for constipation and inability to urinate. No bowel movements for 3/4 days. He was instructed to take miralax and given a name for a urologist.  Review labs from hospital    Patient mentions taking Tamsulosin for his urinary issues and is doing well with that.    Patient is also taking Dulcolax to aid with his bowel movements.    Past medical, surgical, and family history reviewed.  Reviewed and documented all medications   Pt eating well, exercising as tolerated and taking medications as directed    Has no refill requests for rooming MA      ROS    Except positives as noted in the CC & HPI   Constitutional: Denies fevers, chills, night sweats, fatigue, weight changes, change in appetite   Eyes: Denies blurry vision, double vision   ENT: Denies otalgia, trouble hearing, tinnitus, vertigo, nasal congestion, rhinorrhea, sore throat   Neck: Denies swelling, masses   Cardiovascular: Denies chest pain, palpitations, edema, orthopnea, syncope   Respiratory: Denies dyspnea, cough, wheezing, postural nocturnal dyspnea   Gastrointestinal: Denies abdominal pain, nausea, vomiting, diarrhea, constipation, melena, hematochezia   Genitourinary: Denies dysuria, hematuria, frequency, urgency   Musculoskeletal: Denies back pain, neck pain, arthralgias, myalgias   Integumentary: Denies skin lesions, rashes, masses   Neurological: Denies dizziness, headaches, confusion, limb weakness, paresthesias, syncope, convulsions   Psychiatric: Denies depression, anxiety, homicidal ideations, suicidal ideations, sleep disturbances   Endocrine: Denies polyphagia, polydipsia, polyuria, weakness, hair thinning, heat intolerance, cold intolerance, weight changes   Heme/Lymph: Denies easy bruising, easy bleeding, swollen glands     Vitals:    12/23/24 1225   BP: 130/80   Pulse: 67   Resp: 16   Temp: 36.2 °C (97.2 °F)  "  SpO2: 98%   Weight: 61.2 kg (135 lb)   Height: 1.753 m (5' 9\")       PHYSICAL EXAM    GENERAL APPEARANCE: well-developed, well-nourished, 79 y.o. male in no acute distress.    SKIN: warm, pink and dry without rash or concerning lesions.    MENTAL STATUS: alert and oriented × 3. Normal mood and affect appropriate to mood.    CHEST - lungs are clear to auscultation without rales, rhonchi or wheezes.     HEART - regular, rate, and rhythm without murmurs, rubs or gallops.     ABDOMEN - soft, flat, nontender, nondistended. No masses, hepatomegaly or splenomegaly is noted. No rebound, rigidity or guarding is noted. Bowel sounds are normoactive.       Below is the patient's most recent value for Albumin, ALT, AST, BUN, Calcium, Chloride, Cholesterol, CO2, Creatinine, GFR, Glucose, HDL, Hematocrit, Hemoglobin, Hemoglobin A1C, LDL, Magnesium, Phosphorus, Platelets, Potassium, PSA, Sodium, Triglycerides, and WBC.   Lab Results   Component Value Date    ALBUMIN 4.2 12/18/2024    ALT 19 12/18/2024    AST 19 12/18/2024    BUN 19 12/18/2024    CALCIUM 9.9 12/18/2024     12/18/2024    CHOL 144 05/28/2024    CO2 26 12/18/2024    CREATININE 1.36 (H) 12/18/2024    HDL 59.3 05/28/2024    HCT 44.8 12/18/2024    HGB 15.2 12/18/2024    HGBA1C 5.6 05/28/2024     12/18/2024    K 4.2 12/18/2024     12/18/2024    TRIG 100 05/28/2024    WBC 6.8 12/18/2024       ASSESSMENT:  1. Urinary retention        2. Slow transit constipation  Referral to Gastroenterology      3. Change in bowel habits  Referral to Gastroenterology          PLAN:  Patient to continue current medications (with any exceptions as noted) and diet. Follow-up in 12 month(s) otherwise as needed.      Ordered Renal US with PVR to evaluate urinary frequency and incomplete emptying. Will call patient with results when available.     Patient was advised to drink lots of water and was advised to continue with Dulcolax for his bowels.    Patient is to continue to " take Tamsulosin once daily.    Refer patient to Gastroenterology for further evaluation and treatment of constipation.     Scribe Attestation  By signing my name below, ILisette Scribe   attest that this documentation has been prepared under the direction and in the presence of Obinna Abdullahi MD.

## 2024-12-24 ENCOUNTER — HOSPITAL ENCOUNTER (OUTPATIENT)
Dept: RADIOLOGY | Facility: HOSPITAL | Age: 79
Discharge: HOME | End: 2024-12-24
Payer: MEDICARE

## 2024-12-24 DIAGNOSIS — R33.9 INCOMPLETE EMPTYING OF BLADDER: ICD-10-CM

## 2024-12-24 DIAGNOSIS — R35.0 URINARY FREQUENCY: ICD-10-CM

## 2024-12-24 PROCEDURE — 76770 US EXAM ABDO BACK WALL COMP: CPT | Performed by: RADIOLOGY

## 2024-12-24 PROCEDURE — 76770 US EXAM ABDO BACK WALL COMP: CPT

## 2024-12-31 DIAGNOSIS — I25.10 ATHEROSCLEROSIS OF NATIVE CORONARY ARTERY OF NATIVE HEART WITHOUT ANGINA PECTORIS: ICD-10-CM

## 2024-12-31 DIAGNOSIS — E78.2 MIXED HYPERLIPIDEMIA: ICD-10-CM

## 2025-01-06 RX ORDER — EVOLOCUMAB 140 MG/ML
140 INJECTION, SOLUTION SUBCUTANEOUS
Qty: 6 ML | Refills: 3 | Status: SHIPPED | OUTPATIENT
Start: 2025-01-06 | End: 2026-01-06

## 2025-01-20 DIAGNOSIS — N52.9 ERECTILE DYSFUNCTION, UNSPECIFIED ERECTILE DYSFUNCTION TYPE: Primary | ICD-10-CM

## 2025-01-20 RX ORDER — TADALAFIL 10 MG/1
10 TABLET ORAL DAILY PRN
Qty: 12 TABLET | Refills: 1 | Status: SHIPPED | OUTPATIENT
Start: 2025-01-20 | End: 2026-01-20

## 2025-01-23 PROBLEM — E78.5 HYPERLIPIDEMIA: Status: ACTIVE | Noted: 2025-01-23

## 2025-01-23 PROBLEM — I25.10 ARTERIOSCLEROSIS OF CORONARY ARTERY: Status: ACTIVE | Noted: 2025-01-23

## 2025-01-27 ENCOUNTER — TELEPHONE (OUTPATIENT)
Dept: PRIMARY CARE | Facility: CLINIC | Age: 80
End: 2025-01-27
Payer: MEDICARE

## 2025-01-27 DIAGNOSIS — N52.9 ERECTILE DYSFUNCTION, UNSPECIFIED ERECTILE DYSFUNCTION TYPE: Primary | ICD-10-CM

## 2025-01-27 RX ORDER — TADALAFIL 5 MG/1
5 TABLET ORAL DAILY PRN
Qty: 12 TABLET | Refills: 3 | Status: SHIPPED | OUTPATIENT
Start: 2025-01-27 | End: 2025-03-16

## 2025-01-27 NOTE — TELEPHONE ENCOUNTER
Patient called in stating his insurance will not cover the 10 mg tadalafil but they will cover the 5 mg. He is requesting a written prescription for the 5 mg so he can take it to a few different pharmacies to compare prices   Please Advise

## 2025-03-13 ENCOUNTER — TELEPHONE (OUTPATIENT)
Dept: PRIMARY CARE | Facility: CLINIC | Age: 80
End: 2025-03-13
Payer: MEDICARE

## 2025-03-13 DIAGNOSIS — R39.11 URINARY HESITANCY: ICD-10-CM

## 2025-03-13 DIAGNOSIS — R33.9 INCOMPLETE EMPTYING OF BLADDER: ICD-10-CM

## 2025-03-13 RX ORDER — TAMSULOSIN HYDROCHLORIDE 0.4 MG/1
0.4 CAPSULE ORAL DAILY
Qty: 90 CAPSULE | Refills: 3 | Status: SHIPPED | OUTPATIENT
Start: 2025-03-13 | End: 2026-03-13

## 2025-03-13 NOTE — TELEPHONE ENCOUNTER
Patient is requesting his future refills for TAMSULOSIN HCL 0.4 mg 24 hr capsule ONLY [facility] day [facility].

## 2025-03-22 ENCOUNTER — OFFICE VISIT (OUTPATIENT)
Dept: PRIMARY CARE | Facility: CLINIC | Age: 80
End: 2025-03-22
Payer: MEDICARE

## 2025-03-22 VITALS
SYSTOLIC BLOOD PRESSURE: 123 MMHG | RESPIRATION RATE: 16 BRPM | TEMPERATURE: 97.2 F | BODY MASS INDEX: 20.14 KG/M2 | HEIGHT: 69 IN | HEART RATE: 67 BPM | WEIGHT: 136 LBS | DIASTOLIC BLOOD PRESSURE: 60 MMHG | OXYGEN SATURATION: 99 %

## 2025-03-22 DIAGNOSIS — J34.89 NASAL CONGESTION WITH RHINORRHEA: ICD-10-CM

## 2025-03-22 DIAGNOSIS — J00 NASOPHARYNGITIS: Primary | ICD-10-CM

## 2025-03-22 DIAGNOSIS — R09.81 NASAL CONGESTION WITH RHINORRHEA: ICD-10-CM

## 2025-03-22 DIAGNOSIS — R05.9 COUGH IN ADULT PATIENT: ICD-10-CM

## 2025-03-22 PROCEDURE — 99213 OFFICE O/P EST LOW 20 MIN: CPT | Performed by: NURSE PRACTITIONER

## 2025-03-22 RX ORDER — BROMPHENIRAMINE MALEATE, PSEUDOEPHEDRINE HYDROCHLORIDE, AND DEXTROMETHORPHAN HYDROBROMIDE 2; 30; 10 MG/5ML; MG/5ML; MG/5ML
5 SYRUP ORAL 4 TIMES DAILY PRN
Qty: 120 ML | Refills: 1 | Status: SHIPPED | OUTPATIENT
Start: 2025-03-22 | End: 2025-04-01

## 2025-03-22 RX ORDER — CEFDINIR 300 MG/1
300 CAPSULE ORAL 2 TIMES DAILY
Qty: 20 CAPSULE | Refills: 0 | Status: SHIPPED | OUTPATIENT
Start: 2025-03-22 | End: 2025-04-01

## 2025-03-22 ASSESSMENT — ENCOUNTER SYMPTOMS
LOSS OF SENSATION IN FEET: 0
DEPRESSION: 0
OCCASIONAL FEELINGS OF UNSTEADINESS: 0

## 2025-03-22 ASSESSMENT — PATIENT HEALTH QUESTIONNAIRE - PHQ9
2. FEELING DOWN, DEPRESSED OR HOPELESS: NOT AT ALL
SUM OF ALL RESPONSES TO PHQ9 QUESTIONS 1 AND 2: 0
SUM OF ALL RESPONSES TO PHQ9 QUESTIONS 1 AND 2: 0
2. FEELING DOWN, DEPRESSED OR HOPELESS: NOT AT ALL
1. LITTLE INTEREST OR PLEASURE IN DOING THINGS: NOT AT ALL
1. LITTLE INTEREST OR PLEASURE IN DOING THINGS: NOT AT ALL

## 2025-03-22 NOTE — PROGRESS NOTES
Subjective   Patient ID: Yvon Mendoza is a 79 y.o. male who is with a chief complaint of symptoms of respiratory tract infection.     HPI   Patient is a 79 y.o. male who CONSULTED AT [facility] today. Patient is with complaints of nasal congestion, nasal discharge, sore throat, post nasal drip, productive cough, intermittent shortness of breath, fatigue, muscle ache, and diarrhea (once). He denies having any headache, sinus pain, loss of sense of taste, loss of sense of smell, chills nor fever. Patient states that present condition started about 5 days ago. Patient denies history of recent travel, exposure to person/people who tested positive for COVID 19, nor exposure to person/people with flu like symptoms. he denies chest pain, palpitations, nor edema. he stated that he  tried OTC medications which afforded only slight relief of symptoms. he denies nausea, vomiting, abdominal pain, nor any other symptoms.    Patient states he had his COVID vaccine.  Patient states he had the flu shot for this season.    Review of Systems  General: no weight loss, generally healthy, (+) fatigue  Head:  no headaches / sinus pain, no vertigo, no injury  Eyes: no diplopia, no tearing, no pain,   Ears: no change in hearing, no tinnitus, no bleeding, no vertigo  Mouth:  no dental difficulties, no gingival bleeding, (+) sore throat, no loss of sense of taste, (+) post nasal drip,   Nose: (+) congestion, (+) discharge, no bleeding, no obstruction, no loss of sense of smell  Neck: no stiffness, no pain, no tenderness, no masses, no bruit  Pulmonary: (+) intermittent dyspnea, no wheezing, no hemoptysis, (+) productive cough  Cardiovascular: no chest pain, no palpitations, no syncope, no orthopnea  Gastrointestinal: no change in appetite, no dysphagia, no abdominal pains, (+) diarrhea once, no emesis, no melena  Genito Urinary: no dysuria, no urinary urgency, no nocturia, no incontinence, no change in nature of  "urine  Musculoskeletal: (+) muscle ache, no joint pain, no limitation of range of motion, no paresthesia, no numbness  Constitutional: no fever, no chills, no night sweats    Objective   /60 Comment: auto  Pulse 67   Temp 36.2 °C (97.2 °F)   Resp 16   Ht 1.753 m (5' 9\")   Wt 61.7 kg (136 lb)   SpO2 99%   BMI 20.08 kg/m²     Physical Exam  General: ambulatory, in no acute distress  Head: normocephalic, no lesions, no sinus tenderness  Eyes: pink palpebral conjunctiva, anicteric sclerae, PERRLA, EOM's full  Ears: clear external auditory canals, no ear discharge, no bleeding from the ears, tympanic membrane intact  Nose: (+) congested nasal mucosa, (+) yellow mucoid nasal discharge, no bleeding, no obstruction  Throat: (+) erythema, and (+) exudate on posterior pharyngeal wall, no lesion  Neck: supple, no masses, no bruits, no CLADP  Chest: symmetrical chest expansion, no lagging, no retractions, clear breath sounds, no rales, no wheezes    Assessment/Plan   Problem List Items Addressed This Visit    None  Visit Diagnoses         Codes    Nasopharyngitis    -  Primary J00    Relevant Medications    cefdinir (Omnicef) 300 mg capsule    brompheniramine-pseudoeph-DM 2-30-10 mg/5 mL syrup    Cough in adult patient     R05.9    Relevant Medications    cefdinir (Omnicef) 300 mg capsule    brompheniramine-pseudoeph-DM 2-30-10 mg/5 mL syrup    Nasal congestion with rhinorrhea     R09.81, J34.89    Relevant Medications    cefdinir (Omnicef) 300 mg capsule    brompheniramine-pseudoeph-DM 2-30-10 mg/5 mL syrup        DISCHARGE SUMMARY:   Patient was seen and examined. Diagnosis, treatment, treatment options, and possible complications of today's illness discussed and explained to patient. Patient to take medication/s associated with this visit. Patient may also take OTC analgesic/antipyretic if needed for pain/fever. Advised to increase oral fluid intake. Advised steam inhalation if needed to relieve congestion. " Advised warm saline gargle if needed to relieve throat discomfort. Advised Listerine antiseptic mouthwash gargle TID. Patient may use Cepacol oral spray as needed to relieve throat discomfort. Patient was advised to discard the old toothbrush and use a new toothbrush beginning on the third of antibiotics. Advised to come back if with worsening or persistent symptoms. Patient verbalized understanding of plan of care.    Patient to come back in 7 - 10 days if needed for worsening symptoms.

## 2025-03-22 NOTE — PROGRESS NOTES
"Subjective   Patient ID: Yvon Mendoza is a 79 y.o. male who presents for fall/ uri        Symptoms:  cough, body aches, sore throat, post nasal drip,  pt also fell down today 3 times in a row, wasn't able to get up.   Length of symptoms: Tuesday  OTC: acetaminophen and cough tabs with mild help.  Related information:    HPI     Review of Systems    Objective   /60 Comment: auto  Pulse 67   Temp 36.2 °C (97.2 °F)   Resp 16   Ht 1.753 m (5' 9\")   Wt 61.7 kg (136 lb)   SpO2 99%   BMI 20.08 kg/m²     Physical Exam    Assessment/Plan          "

## 2025-03-31 ENCOUNTER — TELEPHONE (OUTPATIENT)
Dept: PRIMARY CARE | Facility: CLINIC | Age: 80
End: 2025-03-31
Payer: MEDICARE

## 2025-03-31 NOTE — TELEPHONE ENCOUNTER
Patient call in stated his symptoms are still the same and requesting refill for the      cefdinir (Omnicef) 300 mg capsule        Sig: Take 1 capsule (300 mg) by mouth 2 times a day for 10 days.          Pharmacy:    [id number] - [address] & [facility] Phone: [phone]   Fax: [phone]          Please assist

## 2025-04-01 ENCOUNTER — DOCUMENTATION (OUTPATIENT)
Dept: PRIMARY CARE | Facility: CLINIC | Age: 80
End: 2025-04-01
Payer: MEDICARE

## 2025-04-01 DIAGNOSIS — R05.9 COUGH IN ADULT PATIENT: ICD-10-CM

## 2025-04-01 DIAGNOSIS — J00 NASOPHARYNGITIS: ICD-10-CM

## 2025-04-01 DIAGNOSIS — R09.81 NASAL CONGESTION WITH RHINORRHEA: ICD-10-CM

## 2025-04-01 DIAGNOSIS — J34.89 NASAL CONGESTION WITH RHINORRHEA: ICD-10-CM

## 2025-04-01 RX ORDER — CEFDINIR 300 MG/1
300 CAPSULE ORAL 2 TIMES DAILY
Qty: 20 CAPSULE | Refills: 0 | Status: SHIPPED | OUTPATIENT
Start: 2025-04-01 | End: 2025-04-11

## 2025-08-30 ENCOUNTER — OFFICE VISIT (OUTPATIENT)
Dept: URGENT CARE | Age: 80
End: 2025-08-30
Payer: MEDICARE

## 2025-08-30 VITALS
SYSTOLIC BLOOD PRESSURE: 146 MMHG | WEIGHT: 134 LBS | BODY MASS INDEX: 19.85 KG/M2 | HEART RATE: 72 BPM | DIASTOLIC BLOOD PRESSURE: 90 MMHG | TEMPERATURE: 98.1 F | RESPIRATION RATE: 20 BRPM | OXYGEN SATURATION: 98 % | HEIGHT: 69 IN

## 2025-08-30 DIAGNOSIS — J01.00 ACUTE NON-RECURRENT MAXILLARY SINUSITIS: Primary | ICD-10-CM

## 2025-08-30 DIAGNOSIS — J02.9 SORE THROAT: ICD-10-CM

## 2025-08-30 RX ORDER — BROMPHENIRAMINE MALEATE, PSEUDOEPHEDRINE HYDROCHLORIDE, AND DEXTROMETHORPHAN HYDROBROMIDE 2; 30; 10 MG/5ML; MG/5ML; MG/5ML
10 SYRUP ORAL 4 TIMES DAILY PRN
Qty: 120 ML | Refills: 0 | Status: SHIPPED | OUTPATIENT
Start: 2025-08-30 | End: 2025-09-09

## 2025-08-30 RX ORDER — CEFDINIR 300 MG/1
300 CAPSULE ORAL 2 TIMES DAILY
Qty: 20 CAPSULE | Refills: 0 | Status: SHIPPED | OUTPATIENT
Start: 2025-08-30 | End: 2025-09-09

## 2025-08-30 ASSESSMENT — ENCOUNTER SYMPTOMS
ALLERGIC/IMMUNOLOGIC NEGATIVE: 1
CONSTITUTIONAL NEGATIVE: 1
ENDOCRINE NEGATIVE: 1
PSYCHIATRIC NEGATIVE: 1
NEUROLOGICAL NEGATIVE: 1
HEMATOLOGIC/LYMPHATIC NEGATIVE: 1
PALPITATIONS: 0
RESPIRATORY NEGATIVE: 1
GASTROINTESTINAL NEGATIVE: 1
EYES NEGATIVE: 1
SINUS PAIN: 1
MUSCULOSKELETAL NEGATIVE: 1
SINUS PRESSURE: 1

## 2025-08-30 ASSESSMENT — PAIN SCALES - GENERAL: PAINLEVEL_OUTOF10: 3

## 2025-09-09 ENCOUNTER — APPOINTMENT (OUTPATIENT)
Dept: CARDIOLOGY | Facility: CLINIC | Age: 80
End: 2025-09-09
Payer: MEDICARE

## 2025-10-01 ENCOUNTER — APPOINTMENT (OUTPATIENT)
Dept: CARDIOLOGY | Facility: CLINIC | Age: 80
End: 2025-10-01
Payer: MEDICARE